# Patient Record
Sex: FEMALE | Race: WHITE | NOT HISPANIC OR LATINO | URBAN - METROPOLITAN AREA
[De-identification: names, ages, dates, MRNs, and addresses within clinical notes are randomized per-mention and may not be internally consistent; named-entity substitution may affect disease eponyms.]

---

## 2024-02-14 ENCOUNTER — NURSING HOME VISIT (OUTPATIENT)
Dept: POST ACUTE CARE | Facility: EXTERNAL LOCATION | Age: 49
End: 2024-02-14
Payer: MEDICAID

## 2024-02-14 DIAGNOSIS — I10 BENIGN ESSENTIAL HYPERTENSION: ICD-10-CM

## 2024-02-14 DIAGNOSIS — G80.9 CEREBRAL PALSY, UNSPECIFIED TYPE (MULTI): ICD-10-CM

## 2024-02-14 DIAGNOSIS — F84.0 AUTISTIC DISORDER (HHS-HCC): Primary | ICD-10-CM

## 2024-02-14 DIAGNOSIS — K21.9 GASTROESOPHAGEAL REFLUX DISEASE, UNSPECIFIED WHETHER ESOPHAGITIS PRESENT: ICD-10-CM

## 2024-02-14 DIAGNOSIS — I10 PRIMARY HYPERTENSION: ICD-10-CM

## 2024-02-14 DIAGNOSIS — G82.20 PARAPLEGIA (MULTI): ICD-10-CM

## 2024-02-14 DIAGNOSIS — I50.9 CHRONIC CONGESTIVE HEART FAILURE, UNSPECIFIED HEART FAILURE TYPE (MULTI): ICD-10-CM

## 2024-02-14 PROCEDURE — 99305 1ST NF CARE MODERATE MDM 35: CPT | Performed by: INTERNAL MEDICINE

## 2024-02-14 NOTE — LETTER
Patient: Kathryn Mojica  : 1975    Encounter Date: 2024    HISTORY & PHYSICAL    Subjective  Chief complaint: Kathryn Mojica is a 48 y.o. female who is a acute skilled care patient being seen and evaluated for multiple medical problems.  Patient presents for weakness.    HPI:  Patient is a 48 year old female with a past medical history of cerebral palsy, paraplegia, type 2 diabetes, and HTN. Patient was transferred to the skilled nursing facility from a different nursing home. Patient will work with therapy towards goals.         Past Medical History:   Diagnosis Date   • Acute embolism and thrombosis of unspecified deep veins of unspecified proximal lower extremity (CMS/HCC)     Deep vein thrombosis (DVT) of proximal lower extremity, unspecified chronicity, unspecified laterality   • Other cerebral palsy (CMS/HCC)     Other cerebral palsy   • Personal history of colonic polyps 2018    History of colonic polyps   • Personal history of other diseases of the digestive system     History of irritable bowel syndrome   • Personal history of other diseases of the musculoskeletal system and connective tissue     History of arthritis   • Personal history of other diseases of the respiratory system     History of asthma       Past Surgical History:   Procedure Laterality Date   • COLONOSCOPY  2018    Complete Colonoscopy   • DILATION AND CURETTAGE OF UTERUS  2018    Dilation And Curettage   • ESOPHAGOGASTRODUODENOSCOPY  2018    Diagnostic Esophagogastroduodenoscopy   • OTHER SURGICAL HISTORY  2018    Esophageal Dilation   • OTHER SURGICAL HISTORY  2018    Hysteroscopy   • OTHER SURGICAL HISTORY  2018    Cutaneous Vesicostomy   • OTHER SURGICAL HISTORY  2018    Repair Of Bladder Reconstruction       No family history on file.    Social History     Socioeconomic History   • Marital status: Single     Spouse name: Not on file   • Number of children: Not on file   •  Years of education: Not on file   • Highest education level: Not on file   Occupational History   • Not on file   Tobacco Use   • Smoking status: Not on file   • Smokeless tobacco: Not on file   Substance and Sexual Activity   • Alcohol use: Not on file   • Drug use: Not on file   • Sexual activity: Not on file   Other Topics Concern   • Not on file   Social History Narrative   • Not on file     Social Determinants of Health     Financial Resource Strain: Not on file   Food Insecurity: Not on file   Transportation Needs: Not on file   Physical Activity: Not on file   Stress: Not on file   Social Connections: Not on file   Intimate Partner Violence: Not on file   Housing Stability: Not on file       Vital signs: 134/71, 97.8, 90, 18, 114.0, 96%    Objective  Physical Exam  Constitutional:       General: She is not in acute distress.  Eyes:      Extraocular Movements: Extraocular movements intact.   Pulmonary:      Effort: Pulmonary effort is normal.   Musculoskeletal:      Cervical back: Neck supple.   Neurological:      Mental Status: She is alert.   Psychiatric:         Mood and Affect: Mood normal.         Behavior: Behavior is cooperative.         Assessment/Plan  Problem List Items Addressed This Visit       HTN (hypertension)     Continue current medications         Autistic disorder - Primary     Supportive care         Benign essential hypertension     Continue current medications         Cerebral palsy (CMS/[facility])     Supportive care  PT OT         CHF (congestive heart failure) (CMS/[facility])     Continue current medications         Gastroesophageal reflux disease     Continue current medications         Paraplegia (CMS/[facility])     Continue current medications          Medications, treatments, and labs reviewed  Continue medications and treatments as listed in [facility]    Scribe Attestation  I, Tristan Yoo   attest that this documentation has been prepared under the direction and in the presence of Hector SIMONS  DO Jamil.    Provider Attestation - Scribe documentation  All medical record entries made by the Scribe were at my direction and personally dictated by me. I have reviewed the chart and agree that the record accurately reflects my personal performance of the history, physical exam, discussion and plan.    Hector Negron DO          Electronically Signed By: Hector Negron DO   3/14/24  9:37 PM

## 2024-02-16 ENCOUNTER — NURSING HOME VISIT (OUTPATIENT)
Dept: POST ACUTE CARE | Facility: EXTERNAL LOCATION | Age: 49
End: 2024-02-16
Payer: MEDICAID

## 2024-02-16 DIAGNOSIS — I10 PRIMARY HYPERTENSION: Primary | ICD-10-CM

## 2024-02-16 DIAGNOSIS — U07.1 COVID: ICD-10-CM

## 2024-02-16 PROCEDURE — 99308 SBSQ NF CARE LOW MDM 20: CPT | Performed by: REGISTERED NURSE

## 2024-02-16 NOTE — LETTER
Patient: Kahtryn Mojica  : 1975    Encounter Date: 2024    PROGRESS NOTE    Subjective  Chief complaint: Kathryn Mojica is a 48 y.o. female who is a long term care patient being seen and evaluated for follow up.     HPI:  HPI seen for bp med adjustment pt states hydralazine making her dizzy pt also + covid   Objective  Vital signs: 122/82, 97.8, 90, 18, 114.0, 96%    Physical Exam  Constitutional:       General: She is not in acute distress.  Eyes:      Extraocular Movements: Extraocular movements intact.   Pulmonary:      Effort: Pulmonary effort is normal.   Musculoskeletal:      Cervical back: Neck supple.   Neurological:      Mental Status: She is alert.   Psychiatric:         Mood and Affect: Mood normal.         Behavior: Behavior is cooperative.         Assessment/Plan  Problem List Items Addressed This Visit       HTN (hypertension) - Primary     Dc hydralazine  Start lisinopril monitor          COVID     Isolation  Symptom management           Medications, treatments, and labs reviewed  Continue medications and treatments as listed in [facility]    Scribe Attestation  IAundrea Scribe   attest that this documentation has been prepared under the direction and in the presence of LARISA Robison.    Provider Attestation - Scribe documentation  All medical record entries made by the Scribe were at my direction and personally dictated by me. I have reviewed the chart and agree that the record accurately reflects my personal performance of the history, physical exam, discussion and plan.    LARISA Robison          Electronically Signed By: LARISA Robison   24  6:05 PM

## 2024-02-19 ENCOUNTER — NURSING HOME VISIT (OUTPATIENT)
Dept: POST ACUTE CARE | Facility: EXTERNAL LOCATION | Age: 49
End: 2024-02-19
Payer: MEDICAID

## 2024-02-19 DIAGNOSIS — U07.1 COVID: Primary | ICD-10-CM

## 2024-02-19 PROCEDURE — 99308 SBSQ NF CARE LOW MDM 20: CPT | Performed by: REGISTERED NURSE

## 2024-02-19 NOTE — LETTER
Patient: Kathryn Mojica  : 1975    Encounter Date: 2024    PROGRESS NOTE    Subjective  Chief complaint: Kathryn Mojica is a 48 y.o. female who is a long term care patient being seen and evaluated for COVID-19.    HPI:  HPI pt c/o clear nasal drainage denies fever chills no sob   Objective  Vital signs: 122/82, 97.8, 90, 18, 114.0, 96%    Physical Exam  Constitutional:       General: She is not in acute distress.  Eyes:      Extraocular Movements: Extraocular movements intact.   Pulmonary:      Effort: Pulmonary effort is normal.   Musculoskeletal:      Cervical back: Neck supple.   Neurological:      Mental Status: She is alert.   Psychiatric:         Mood and Affect: Mood normal.         Behavior: Behavior is cooperative.         Assessment/Plan  Problem List Items Addressed This Visit       COVID - Primary     Isolation  Symptom management           Medications, treatments, and labs reviewed  Continue medications and treatments as listed in [facility]    Scribe Attestation  IAundrea Scribe   attest that this documentation has been prepared under the direction and in the presence of LARISA Robison.    Provider Attestation - Scribe documentation  All medical record entries made by the Scribe were at my direction and personally dictated by me. I have reviewed the chart and agree that the record accurately reflects my personal performance of the history, physical exam, discussion and plan.    LARISA Robison          Electronically Signed By: LARISA Robison   3/11/24  8:00 PM

## 2024-02-20 ENCOUNTER — NURSING HOME VISIT (OUTPATIENT)
Dept: POST ACUTE CARE | Facility: EXTERNAL LOCATION | Age: 49
End: 2024-02-20
Payer: MEDICAID

## 2024-02-20 DIAGNOSIS — U07.1 COVID: Primary | ICD-10-CM

## 2024-02-20 PROCEDURE — 99308 SBSQ NF CARE LOW MDM 20: CPT | Performed by: REGISTERED NURSE

## 2024-02-20 NOTE — LETTER
Patient: Kathryn Mojica  : 1975    Encounter Date: 2024    PROGRESS NOTE    Subjective  Chief complaint: Kathryn Mojica is a 48 y.o. female who is a long term care patient being seen and evaluated for COVID-19.     HPI:  HPI patient isolated with COVID-19 denies shortness of breath fever chills  Objective  Vital signs: 136/78, 97.8, 90, 18, 114.0, 96%    Physical Exam  Constitutional:       General: She is not in acute distress.  Eyes:      Extraocular Movements: Extraocular movements intact.   Pulmonary:      Effort: Pulmonary effort is normal.   Musculoskeletal:      Cervical back: Neck supple.   Neurological:      Mental Status: She is alert.   Psychiatric:         Mood and Affect: Mood normal.         Behavior: Behavior is cooperative.         Assessment/Plan  Problem List Items Addressed This Visit       COVID - Primary     Isolate   Supportive care           Medications, treatments, and labs reviewed  Continue medications and treatments as listed in [facility]    Scribe Attestation  IAundrea Scribe   attest that this documentation has been prepared under the direction and in the presence of LARISA Robison.    Provider Attestation - Scribe documentation  All medical record entries made by the Scribe were at my direction and personally dictated by me. I have reviewed the chart and agree that the record accurately reflects my personal performance of the history, physical exam, discussion and plan.    LARISA Robison          Electronically Signed By: LARISA Robison   3/14/24  9:45 AM

## 2024-02-20 NOTE — PROGRESS NOTES
PROGRESS NOTE    Subjective   Chief complaint: Kathryn Mojica is a 48 y.o. female who is a long term care patient being seen and evaluated for COVID-19.    HPI:  HPI pt c/o clear nasal drainage denies fever chills no sob   Objective   Vital signs: 122/82, 97.8, 90, 18, 114.0, 96%    Physical Exam  Constitutional:       General: She is not in acute distress.  Eyes:      Extraocular Movements: Extraocular movements intact.   Pulmonary:      Effort: Pulmonary effort is normal.   Musculoskeletal:      Cervical back: Neck supple.   Neurological:      Mental Status: She is alert.   Psychiatric:         Mood and Affect: Mood normal.         Behavior: Behavior is cooperative.         Assessment/Plan   Problem List Items Addressed This Visit       COVID - Primary     Isolation  Symptom management           Medications, treatments, and labs reviewed  Continue medications and treatments as listed in [facility]    Scribe Attestation  IAundrea Scribe   attest that this documentation has been prepared under the direction and in the presence of LARISA Robison.    Provider Attestation - Scribe documentation  All medical record entries made by the Scribe were at my direction and personally dictated by me. I have reviewed the chart and agree that the record accurately reflects my personal performance of the history, physical exam, discussion and plan.    LARISA Robison

## 2024-02-20 NOTE — PROGRESS NOTES
PROGRESS NOTE    Subjective   Chief complaint: Kathryn Mojica is a 48 y.o. female who is a long term care patient being seen and evaluated for follow up.     HPI:  HPI seen for bp med adjustment pt states hydralazine making her dizzy pt also + covid   Objective   Vital signs: 122/82, 97.8, 90, 18, 114.0, 96%    Physical Exam  Constitutional:       General: She is not in acute distress.  Eyes:      Extraocular Movements: Extraocular movements intact.   Pulmonary:      Effort: Pulmonary effort is normal.   Musculoskeletal:      Cervical back: Neck supple.   Neurological:      Mental Status: She is alert.   Psychiatric:         Mood and Affect: Mood normal.         Behavior: Behavior is cooperative.         Assessment/Plan   Problem List Items Addressed This Visit       HTN (hypertension) - Primary     Dc hydralazine  Start lisinopril monitor          COVID     Isolation  Symptom management           Medications, treatments, and labs reviewed  Continue medications and treatments as listed in [facility]    Scribe Attestation  Aundrea WOO Scribe   attest that this documentation has been prepared under the direction and in the presence of LARISA Robison.    Provider Attestation - Scribe documentation  All medical record entries made by the Scribe were at my direction and personally dictated by me. I have reviewed the chart and agree that the record accurately reflects my personal performance of the history, physical exam, discussion and plan.    LARISA Robison

## 2024-02-21 NOTE — PROGRESS NOTES
PROGRESS NOTE    Subjective   Chief complaint: Kathryn Mojica is a 48 y.o. female who is a long term care patient being seen and evaluated for COVID-19.     HPI:  HPI patient isolated with COVID-19 denies shortness of breath fever chills  Objective   Vital signs: 136/78, 97.8, 90, 18, 114.0, 96%    Physical Exam  Constitutional:       General: She is not in acute distress.  Eyes:      Extraocular Movements: Extraocular movements intact.   Pulmonary:      Effort: Pulmonary effort is normal.   Musculoskeletal:      Cervical back: Neck supple.   Neurological:      Mental Status: She is alert.   Psychiatric:         Mood and Affect: Mood normal.         Behavior: Behavior is cooperative.         Assessment/Plan   Problem List Items Addressed This Visit       COVID - Primary     Isolate   Supportive care           Medications, treatments, and labs reviewed  Continue medications and treatments as listed in [facility]    Scribe Attestation  IAundrea Scribe   attest that this documentation has been prepared under the direction and in the presence of LARISA Robison.    Provider Attestation - Scribe documentation  All medical record entries made by the Scribe were at my direction and personally dictated by me. I have reviewed the chart and agree that the record accurately reflects my personal performance of the history, physical exam, discussion and plan.    LARISA Robison

## 2024-02-23 ENCOUNTER — NURSING HOME VISIT (OUTPATIENT)
Dept: POST ACUTE CARE | Facility: EXTERNAL LOCATION | Age: 49
End: 2024-02-23
Payer: MEDICAID

## 2024-02-23 DIAGNOSIS — U07.1 COVID: Primary | ICD-10-CM

## 2024-02-23 PROCEDURE — 99308 SBSQ NF CARE LOW MDM 20: CPT | Performed by: REGISTERED NURSE

## 2024-02-23 NOTE — LETTER
Patient: Kathryn Mojica  : 1975    Encounter Date: 2024    PROGRESS NOTE    Subjective  Chief complaint: Kathryn Mojica is a 48 y.o. female who is a long term care patient being seen and evaluated for COVID-19.    HPI:  HPI patient continues to be in isolation for COVID-19 denies shortness of breath fever chills.  Objective  Vital signs: 134/90, 97.8, 90, 18, 114.0, 96%    Physical Exam  Constitutional:       General: She is not in acute distress.  Eyes:      Extraocular Movements: Extraocular movements intact.   Pulmonary:      Effort: Pulmonary effort is normal.   Musculoskeletal:      Cervical back: Neck supple.   Neurological:      Mental Status: She is alert.   Psychiatric:         Mood and Affect: Mood normal.         Behavior: Behavior is cooperative.         Assessment/Plan  Problem List Items Addressed This Visit       COVID - Primary     Isolate   Supportive care           Medications, treatments, and labs reviewed  Continue medications and treatments as listed in [facility]    Scribe Attestation  IAundrea Scribe   attest that this documentation has been prepared under the direction and in the presence of LARISA Robison.    Provider Attestation - Scribe documentation  All medical record entries made by the Scribe were at my direction and personally dictated by me. I have reviewed the chart and agree that the record accurately reflects my personal performance of the history, physical exam, discussion and plan.    LARISA Robison          Electronically Signed By: LARISA Robison   3/14/24 11:09 AM

## 2024-02-26 ENCOUNTER — NURSING HOME VISIT (OUTPATIENT)
Dept: POST ACUTE CARE | Facility: EXTERNAL LOCATION | Age: 49
End: 2024-02-26
Payer: MEDICAID

## 2024-02-26 DIAGNOSIS — G82.20 PARAPLEGIA (MULTI): ICD-10-CM

## 2024-02-26 DIAGNOSIS — I50.9 CHRONIC CONGESTIVE HEART FAILURE, UNSPECIFIED HEART FAILURE TYPE (MULTI): ICD-10-CM

## 2024-02-26 DIAGNOSIS — U07.1 COVID: ICD-10-CM

## 2024-02-26 DIAGNOSIS — I10 BENIGN ESSENTIAL HYPERTENSION: Primary | ICD-10-CM

## 2024-02-26 PROCEDURE — 99308 SBSQ NF CARE LOW MDM 20: CPT | Performed by: INTERNAL MEDICINE

## 2024-02-26 NOTE — PROGRESS NOTES
HISTORY & PHYSICAL    Subjective   Chief complaint: Kathryn Mojica is a 48 y.o. female who is a acute skilled care patient being seen and evaluated for multiple medical problems.  Patient presents for weakness.    HPI:  Patient is a 48 year old female with a past medical history of cerebral palsy, paraplegia, type 2 diabetes, and HTN. Patient was transferred to the skilled nursing facility from a different nursing home. Patient will work with therapy towards goals.         Past Medical History:   Diagnosis Date    Acute embolism and thrombosis of unspecified deep veins of unspecified proximal lower extremity (CMS/HCC)     Deep vein thrombosis (DVT) of proximal lower extremity, unspecified chronicity, unspecified laterality    Other cerebral palsy (CMS/HCC)     Other cerebral palsy    Personal history of colonic polyps 04/16/2018    History of colonic polyps    Personal history of other diseases of the digestive system     History of irritable bowel syndrome    Personal history of other diseases of the musculoskeletal system and connective tissue     History of arthritis    Personal history of other diseases of the respiratory system     History of asthma       Past Surgical History:   Procedure Laterality Date    COLONOSCOPY  04/16/2018    Complete Colonoscopy    DILATION AND CURETTAGE OF UTERUS  04/16/2018    Dilation And Curettage    ESOPHAGOGASTRODUODENOSCOPY  04/16/2018    Diagnostic Esophagogastroduodenoscopy    OTHER SURGICAL HISTORY  02/21/2018    Esophageal Dilation    OTHER SURGICAL HISTORY  02/21/2018    Hysteroscopy    OTHER SURGICAL HISTORY  02/21/2018    Cutaneous Vesicostomy    OTHER SURGICAL HISTORY  08/01/2018    Repair Of Bladder Reconstruction       No family history on file.    Social History     Socioeconomic History    Marital status: Single     Spouse name: Not on file    Number of children: Not on file    Years of education: Not on file    Highest education level: Not on file   Occupational  History    Not on file   Tobacco Use    Smoking status: Not on file    Smokeless tobacco: Not on file   Substance and Sexual Activity    Alcohol use: Not on file    Drug use: Not on file    Sexual activity: Not on file   Other Topics Concern    Not on file   Social History Narrative    Not on file     Social Determinants of Health     Financial Resource Strain: Not on file   Food Insecurity: Not on file   Transportation Needs: Not on file   Physical Activity: Not on file   Stress: Not on file   Social Connections: Not on file   Intimate Partner Violence: Not on file   Housing Stability: Not on file       Vital signs: 134/71, 97.8, 90, 18, 114.0, 96%    Objective   Physical Exam  Constitutional:       General: She is not in acute distress.  Eyes:      Extraocular Movements: Extraocular movements intact.   Pulmonary:      Effort: Pulmonary effort is normal.   Musculoskeletal:      Cervical back: Neck supple.   Neurological:      Mental Status: She is alert.   Psychiatric:         Mood and Affect: Mood normal.         Behavior: Behavior is cooperative.         Assessment/Plan   Problem List Items Addressed This Visit       HTN (hypertension)     Continue current medications         Autistic disorder - Primary     Supportive care         Benign essential hypertension     Continue current medications         Cerebral palsy (CMS/[facility])     Supportive care  PT OT         CHF (congestive heart failure) (CMS/[facility])     Continue current medications         Gastroesophageal reflux disease     Continue current medications         Paraplegia (CMS/[facility])     Continue current medications          Medications, treatments, and labs reviewed  Continue medications and treatments as listed in [facility]    Scribe Attestation  I, Tristan Yoo   attest that this documentation has been prepared under the direction and in the presence of Hector Negron DO.    Provider Attestation - Scribe documentation  All medical record entries made  by the Scribe were at my direction and personally dictated by me. I have reviewed the chart and agree that the record accurately reflects my personal performance of the history, physical exam, discussion and plan.    Hector Negron, DO

## 2024-02-26 NOTE — PROGRESS NOTES
PROGRESS NOTE    Subjective   Chief complaint: Kathryn Mojica is a 48 y.o. female who is a long term care patient being seen and evaluated for COVID-19.    HPI:  HPI patient continues to be in isolation for COVID-19 denies shortness of breath fever chills.  Objective   Vital signs: 134/90, 97.8, 90, 18, 114.0, 96%    Physical Exam  Constitutional:       General: She is not in acute distress.  Eyes:      Extraocular Movements: Extraocular movements intact.   Pulmonary:      Effort: Pulmonary effort is normal.   Musculoskeletal:      Cervical back: Neck supple.   Neurological:      Mental Status: She is alert.   Psychiatric:         Mood and Affect: Mood normal.         Behavior: Behavior is cooperative.         Assessment/Plan   Problem List Items Addressed This Visit       COVID - Primary     Isolate   Supportive care           Medications, treatments, and labs reviewed  Continue medications and treatments as listed in [facility]    Scribe Attestation  Aundrea WOO Scribe   attest that this documentation has been prepared under the direction and in the presence of LARISA Robison.    Provider Attestation - Scribe documentation  All medical record entries made by the Scribe were at my direction and personally dictated by me. I have reviewed the chart and agree that the record accurately reflects my personal performance of the history, physical exam, discussion and plan.    LARISA Robison

## 2024-02-26 NOTE — LETTER
Patient: Kathryn Mojica  : 1975    Encounter Date: 2024    PROGRESS NOTE    Subjective  Chief complaint: Kathryn Mojica is a 48 y.o. female who is a long term care patient being seen and evaluated for covid    HPI:  Asked by nursing to see patient due to being COVID-positive.  Patient seen and examined at bedside.  No issues.  Breathing is at baseline.  No fevers or chills.      Objective  Vital signs: 129/82, 98.1, 86, 18, 106.0, 97%    Physical Exam  Constitutional:       General: She is not in acute distress.  Eyes:      Extraocular Movements: Extraocular movements intact.   Pulmonary:      Effort: Pulmonary effort is normal.   Musculoskeletal:      Cervical back: Neck supple.   Neurological:      Mental Status: She is alert.   Psychiatric:         Mood and Affect: Mood normal.         Behavior: Behavior is cooperative.       Assessment/Plan  Problem List Items Addressed This Visit       COVID     Continue current medications  Supportive care  Monitor oxygen levels         Benign essential hypertension - Primary     Continue current medications         CHF (congestive heart failure) (CMS/[facility])     Continue current medications         Paraplegia (CMS/[facility])     Supportive care          Medications, treatments, and labs reviewed  Continue medications and treatments as listed in [facility]    Scribe Attestation  IAundrea Scribe   attest that this documentation has been prepared under the direction and in the presence of Hector Negron DO.    Provider Attestation - Scribe documentation  All medical record entries made by the Scribe were at my direction and personally dictated by me. I have reviewed the chart and agree that the record accurately reflects my personal performance of the history, physical exam, discussion and plan.    Hector Negron DO          Electronically Signed By: Hector Negron DO   3/30/24  7:04 PM

## 2024-02-28 ENCOUNTER — NURSING HOME VISIT (OUTPATIENT)
Dept: POST ACUTE CARE | Facility: EXTERNAL LOCATION | Age: 49
End: 2024-02-28
Payer: MEDICAID

## 2024-02-28 DIAGNOSIS — I49.8 OTHER SPECIFIED CARDIAC ARRHYTHMIAS: ICD-10-CM

## 2024-02-28 DIAGNOSIS — K21.9 GASTROESOPHAGEAL REFLUX DISEASE, UNSPECIFIED WHETHER ESOPHAGITIS PRESENT: ICD-10-CM

## 2024-02-28 DIAGNOSIS — I10 BENIGN ESSENTIAL HYPERTENSION: Primary | ICD-10-CM

## 2024-02-28 PROBLEM — U07.1 COVID: Status: ACTIVE | Noted: 2024-02-28

## 2024-02-28 PROCEDURE — 99309 SBSQ NF CARE MODERATE MDM 30: CPT | Performed by: REGISTERED NURSE

## 2024-02-28 NOTE — LETTER
Patient: Kathryn Mojica  : 1975    Encounter Date: 2024    PROGRESS NOTE    Subjective  Chief complaint: Kathryn Mojica is a 48 y.o. female patient being seen and evaluated for monthly general medical care and follow-up    HPI:  24 Patient presents for general medical care and f/u.  Patient seen and examined at bedside.  No issues per nursing.  Patient has no acute complaints.          Objective  Vital signs: 142/73, 97.8, 90, 18, 114.0, 96%    Physical Exam  Constitutional:       General: She is not in acute distress.  Eyes:      Extraocular Movements: Extraocular movements intact.   Pulmonary:      Effort: Pulmonary effort is normal.   Musculoskeletal:      Cervical back: Neck supple.   Neurological:      Mental Status: She is alert.   Psychiatric:         Mood and Affect: Mood normal.         Behavior: Behavior is cooperative.         Assessment/Plan  Problem List Items Addressed This Visit       Benign essential hypertension - Primary     Continue lisinopril         Gastroesophageal reflux disease     Diet-controlled          Other specified cardiac arrhythmias     Xarelto  Monitor for bruising and bleeding          Medications, treatments, and labs reviewed  Continue medications and treatments as listed in EMR    Scribe Attestation  IAundrea Scribe   attest that this documentation has been prepared under the direction and in the presence of LARISA Robison.    Provider Attestation - Scribe documentation  All medical record entries made by the Scribe were at my direction and personally dictated by me. I have reviewed the chart and agree that the record accurately reflects my personal performance of the history, physical exam, discussion and plan.    LARISA Robison      Electronically Signed By: LARISA Robison   3/21/24  9:40 AM

## 2024-02-29 NOTE — PROGRESS NOTES
PROGRESS NOTE    Subjective   Chief complaint: Kathryn Mojica is a 48 y.o. female patient being seen and evaluated for monthly general medical care and follow-up    HPI:  2/28/24 Patient presents for general medical care and f/u.  Patient seen and examined at bedside.  No issues per nursing.  Patient has no acute complaints.          Objective   Vital signs: 142/73, 97.8, 90, 18, 114.0, 96%    Physical Exam  Constitutional:       General: She is not in acute distress.  Eyes:      Extraocular Movements: Extraocular movements intact.   Pulmonary:      Effort: Pulmonary effort is normal.   Musculoskeletal:      Cervical back: Neck supple.   Neurological:      Mental Status: She is alert.   Psychiatric:         Mood and Affect: Mood normal.         Behavior: Behavior is cooperative.         Assessment/Plan   Problem List Items Addressed This Visit       Benign essential hypertension - Primary     Continue lisinopril         Gastroesophageal reflux disease     Diet-controlled          Other specified cardiac arrhythmias     Xarelto  Monitor for bruising and bleeding          Medications, treatments, and labs reviewed  Continue medications and treatments as listed in EMR    Scribe Attestation  IAundrea Scribe   attest that this documentation has been prepared under the direction and in the presence of LARISA Robison.    Provider Attestation - Scribe documentation  All medical record entries made by the Scribe were at my direction and personally dictated by me. I have reviewed the chart and agree that the record accurately reflects my personal performance of the history, physical exam, discussion and plan.    LARISA Robison

## 2024-03-04 ENCOUNTER — NURSING HOME VISIT (OUTPATIENT)
Dept: POST ACUTE CARE | Facility: EXTERNAL LOCATION | Age: 49
End: 2024-03-04
Payer: MEDICAID

## 2024-03-04 DIAGNOSIS — G80.9 CEREBRAL PALSY, UNSPECIFIED TYPE (MULTI): ICD-10-CM

## 2024-03-04 DIAGNOSIS — U07.1 COVID: ICD-10-CM

## 2024-03-04 DIAGNOSIS — G82.20 PARAPLEGIA (MULTI): ICD-10-CM

## 2024-03-04 DIAGNOSIS — I10 BENIGN ESSENTIAL HYPERTENSION: Primary | ICD-10-CM

## 2024-03-04 DIAGNOSIS — I50.9 CHRONIC CONGESTIVE HEART FAILURE, UNSPECIFIED HEART FAILURE TYPE (MULTI): ICD-10-CM

## 2024-03-04 PROCEDURE — 99308 SBSQ NF CARE LOW MDM 20: CPT | Performed by: INTERNAL MEDICINE

## 2024-03-04 NOTE — LETTER
Patient: Kathryn Mojica  : 1975    Encounter Date: 2024    PROGRESS NOTE    Subjective  Chief complaint: Kathryn Mojica is a 48 y.o. female who is a long term care patient being seen and evaluated for recovered from COVID-19.     HPI:  Patient just finished quarantine for COVID. She is doing well. No issues.       Objective  Vital signs: 123/69, 97.8, 90, 18, 114.0, 96%    Physical Exam  Constitutional:       General: She is not in acute distress.  Eyes:      Extraocular Movements: Extraocular movements intact.   Pulmonary:      Effort: Pulmonary effort is normal.   Musculoskeletal:      Cervical back: Neck supple.   Neurological:      Mental Status: She is alert.   Psychiatric:         Mood and Affect: Mood normal.         Behavior: Behavior is cooperative.         Assessment/Plan  Problem List Items Addressed This Visit       COVID     Resolved          Benign essential hypertension - Primary     Continue current medications          Cerebral palsy (CMS/[facility])     Supportive care         CHF (congestive heart failure) (CMS/[facility])     Continue current medications          Paraplegia (CMS/[facility])     Supportive care          Medications, treatments, and labs reviewed  Continue medications and treatments as listed in [facility]    Scribe Attestation  I, Tristan Yoo   attest that this documentation has been prepared under the direction and in the presence of Hector Negron DO.    Provider Attestation - Scribe documentation  All medical record entries made by the Scribe were at my direction and personally dictated by me. I have reviewed the chart and agree that the record accurately reflects my personal performance of the history, physical exam, discussion and plan.    Hector Negron DO          Electronically Signed By: Hector Negron DO   24  9:46 PM

## 2024-03-05 NOTE — PROGRESS NOTES
PROGRESS NOTE    Subjective   Chief complaint: Kathryn Mojica is a 48 y.o. female who is a long term care patient being seen and evaluated for recovered from COVID-19.     HPI:  Patient just finished quarantine for COVID. She is doing well. No issues.       Objective   Vital signs: 123/69, 97.8, 90, 18, 114.0, 96%    Physical Exam  Constitutional:       General: She is not in acute distress.  Eyes:      Extraocular Movements: Extraocular movements intact.   Pulmonary:      Effort: Pulmonary effort is normal.   Musculoskeletal:      Cervical back: Neck supple.   Neurological:      Mental Status: She is alert.   Psychiatric:         Mood and Affect: Mood normal.         Behavior: Behavior is cooperative.         Assessment/Plan   Problem List Items Addressed This Visit       COVID     Resolved          Benign essential hypertension - Primary     Continue current medications          Cerebral palsy (CMS/[facility])     Supportive care         CHF (congestive heart failure) (CMS/[facility])     Continue current medications          Paraplegia (CMS/[facility])     Supportive care          Medications, treatments, and labs reviewed  Continue medications and treatments as listed in [facility]    Scribe Attestation  IAundrea Scribe   attest that this documentation has been prepared under the direction and in the presence of Hector Negron DO.    Provider Attestation - Scribe documentation  All medical record entries made by the Scribe were at my direction and personally dictated by me. I have reviewed the chart and agree that the record accurately reflects my personal performance of the history, physical exam, discussion and plan.    Hector Negron DO

## 2024-03-14 PROBLEM — G80.9 CEREBRAL PALSY (MULTI): Status: ACTIVE | Noted: 2024-03-14

## 2024-03-14 PROBLEM — I10 BENIGN ESSENTIAL HYPERTENSION: Status: ACTIVE | Noted: 2024-03-14

## 2024-03-14 PROBLEM — K21.9 GASTROESOPHAGEAL REFLUX DISEASE: Status: ACTIVE | Noted: 2024-03-14

## 2024-03-14 PROBLEM — I50.9 CHF (CONGESTIVE HEART FAILURE) (MULTI): Status: ACTIVE | Noted: 2024-03-14

## 2024-03-14 PROBLEM — F84.0 AUTISTIC DISORDER (HHS-HCC): Status: ACTIVE | Noted: 2024-03-14

## 2024-03-14 PROBLEM — G82.20 PARAPLEGIA (MULTI): Status: ACTIVE | Noted: 2024-03-14

## 2024-03-15 ENCOUNTER — NURSING HOME VISIT (OUTPATIENT)
Dept: POST ACUTE CARE | Facility: EXTERNAL LOCATION | Age: 49
End: 2024-03-15
Payer: MEDICAID

## 2024-03-15 DIAGNOSIS — R10.9 ABDOMINAL CRAMPING: Primary | ICD-10-CM

## 2024-03-15 PROCEDURE — 99308 SBSQ NF CARE LOW MDM 20: CPT | Performed by: REGISTERED NURSE

## 2024-03-15 NOTE — LETTER
Patient: Kathryn Mojica  : 1975    Encounter Date: 03/15/2024    PROGRESS NOTE    Subjective  Chief complaint: Kathryn Mojica is a 48 y.o. female who is a long term care patient being seen and evaluated for follow up.     HPI:  HPI pt c/o abd cramping, states having BM and thinks it could be diet related   Objective  Vital signs: 123/85, 98.1, 85, 18, 114.0, 97%    Physical Exam  Constitutional:       General: She is not in acute distress.  Eyes:      Extraocular Movements: Extraocular movements intact.   Pulmonary:      Effort: Pulmonary effort is normal.   Musculoskeletal:      Cervical back: Neck supple.   Neurological:      Mental Status: She is alert.   Psychiatric:         Mood and Affect: Mood normal.         Behavior: Behavior is cooperative.         Assessment/Plan  Problem List Items Addressed This Visit       Abdominal cramping - Primary     UACNS, CBC, BMP  Monitor BM          Medications, treatments, and labs reviewed  Continue medications and treatments as listed in [facility]    Scribe Attestation  IAundrea Scribe   attest that this documentation has been prepared under the direction and in the presence of LRAISA Robison.    Provider Attestation - Scribe documentation  All medical record entries made by the Scribe were at my direction and personally dictated by me. I have reviewed the chart and agree that the record accurately reflects my personal performance of the history, physical exam, discussion and plan.    LARISA Robison          Electronically Signed By: LARISA Robison   3/28/24 12:07 PM

## 2024-03-18 ENCOUNTER — NURSING HOME VISIT (OUTPATIENT)
Dept: POST ACUTE CARE | Facility: EXTERNAL LOCATION | Age: 49
End: 2024-03-18
Payer: MEDICAID

## 2024-03-18 DIAGNOSIS — N30.00 ACUTE CYSTITIS WITHOUT HEMATURIA: Primary | ICD-10-CM

## 2024-03-18 PROCEDURE — 99309 SBSQ NF CARE MODERATE MDM 30: CPT | Performed by: INTERNAL MEDICINE

## 2024-03-18 NOTE — LETTER
Patient: Kathryn Mojica  : 1975    Encounter Date: 2024    PROGRESS NOTE    Subjective  Chief complaint: Kathryn Mojica is a 48 y.o. female who is a long term care patient being seen and evaluated for uti.    HPI:  Patient seen for follow up UA. Consistent with UTI. Started on antibiotics.      Objective  Vital signs: 129/82, 98.1, 86, 18, 106.0, 97%    Physical Exam  Constitutional:       General: She is not in acute distress.  Eyes:      Extraocular Movements: Extraocular movements intact.   Pulmonary:      Effort: Pulmonary effort is normal.   Musculoskeletal:      Cervical back: Neck supple.   Neurological:      Mental Status: She is alert.   Psychiatric:         Mood and Affect: Mood normal.         Behavior: Behavior is cooperative.       Assessment/Plan  Problem List Items Addressed This Visit       Acute cystitis without hematuria - Primary     Macrobid          Medications, treatments, and labs reviewed  Continue medications and treatments as listed in [facility]    Scribe Attestation  I, Tristan Yoo   attest that this documentation has been prepared under the direction and in the presence of Hector Negron DO.    Provider Attestation - Scribe documentation  All medical record entries made by the Scribe were at my direction and personally dictated by me. I have reviewed the chart and agree that the record accurately reflects my personal performance of the history, physical exam, discussion and plan.    Hector Negron DO          Electronically Signed By: Hector Negron DO   24 10:46 PM

## 2024-03-19 NOTE — PROGRESS NOTES
PROGRESS NOTE    Subjective   Chief complaint: Kathryn Mojica is a 48 y.o. female who is a long term care patient being seen and evaluated for follow up.     HPI:  HPI pt c/o abd cramping, states having BM and thinks it could be diet related   Objective   Vital signs: 123/85, 98.1, 85, 18, 114.0, 97%    Physical Exam  Constitutional:       General: She is not in acute distress.  Eyes:      Extraocular Movements: Extraocular movements intact.   Pulmonary:      Effort: Pulmonary effort is normal.   Musculoskeletal:      Cervical back: Neck supple.   Neurological:      Mental Status: She is alert.   Psychiatric:         Mood and Affect: Mood normal.         Behavior: Behavior is cooperative.         Assessment/Plan   Problem List Items Addressed This Visit       Abdominal cramping - Primary     UACNS, CBC, BMP  Monitor BM          Medications, treatments, and labs reviewed  Continue medications and treatments as listed in [facility]    Scribe Attestation  I, Tristan Yoo   attest that this documentation has been prepared under the direction and in the presence of LARISA Robison.    Provider Attestation - Scribe documentation  All medical record entries made by the Scribe were at my direction and personally dictated by me. I have reviewed the chart and agree that the record accurately reflects my personal performance of the history, physical exam, discussion and plan.    LARISA Robison

## 2024-03-21 PROBLEM — I49.8 OTHER SPECIFIED CARDIAC ARRHYTHMIAS: Status: ACTIVE | Noted: 2024-03-21

## 2024-03-22 ENCOUNTER — NURSING HOME VISIT (OUTPATIENT)
Dept: POST ACUTE CARE | Facility: EXTERNAL LOCATION | Age: 49
End: 2024-03-22
Payer: MEDICAID

## 2024-03-22 DIAGNOSIS — K21.9 GASTROESOPHAGEAL REFLUX DISEASE, UNSPECIFIED WHETHER ESOPHAGITIS PRESENT: Primary | ICD-10-CM

## 2024-03-22 PROCEDURE — 99308 SBSQ NF CARE LOW MDM 20: CPT | Performed by: REGISTERED NURSE

## 2024-03-22 NOTE — LETTER
Patient: Kathryn Mojica  : 1975    Encounter Date: 2024    PROGRESS NOTE    Subjective  Chief complaint: Kathryn Mojica is a 48 y.o. female who is a long term care patient being seen and evaluated for follow up.     HPI:  HPI pt request visit, want to discuss food options, refer to facility   Objective  Vital signs: 142/82, 98.1, 89, 18, 106.0, 97%    Physical Exam  Constitutional:       General: She is not in acute distress.  Eyes:      Extraocular Movements: Extraocular movements intact.   Pulmonary:      Effort: Pulmonary effort is normal.   Musculoskeletal:      Cervical back: Neck supple.   Neurological:      Mental Status: She is alert.   Psychiatric:         Mood and Affect: Mood normal.         Behavior: Behavior is cooperative.         Assessment/Plan  Problem List Items Addressed This Visit       Gastroesophageal reflux disease - Primary     Refer to food choices per facility           Medications, treatments, and labs reviewed  Continue medications and treatments as listed in [facility]    Scribe Attestation  IAundrea Scribe   attest that this documentation has been prepared under the direction and in the presence of LARISA Robison.    Provider Attestation - Scribe documentation  All medical record entries made by the Scribe were at my direction and personally dictated by me. I have reviewed the chart and agree that the record accurately reflects my personal performance of the history, physical exam, discussion and plan.    LARISA Robison          Electronically Signed By: LARISA Robison   24  8:16 PM

## 2024-03-25 NOTE — PROGRESS NOTES
PROGRESS NOTE    Subjective   Chief complaint: Kathryn Mojica is a 48 y.o. female who is a long term care patient being seen and evaluated for follow up.     HPI:  HPI pt request visit, want to discuss food options, refer to facility   Objective   Vital signs: 142/82, 98.1, 89, 18, 106.0, 97%    Physical Exam  Constitutional:       General: She is not in acute distress.  Eyes:      Extraocular Movements: Extraocular movements intact.   Pulmonary:      Effort: Pulmonary effort is normal.   Musculoskeletal:      Cervical back: Neck supple.   Neurological:      Mental Status: She is alert.   Psychiatric:         Mood and Affect: Mood normal.         Behavior: Behavior is cooperative.         Assessment/Plan   Problem List Items Addressed This Visit       Gastroesophageal reflux disease - Primary     Refer to food choices per facility           Medications, treatments, and labs reviewed  Continue medications and treatments as listed in [facility]    Scribe Attestation  IAundrea Scribe   attest that this documentation has been prepared under the direction and in the presence of LARISA Robison.    Provider Attestation - Scribe documentation  All medical record entries made by the Scribe were at my direction and personally dictated by me. I have reviewed the chart and agree that the record accurately reflects my personal performance of the history, physical exam, discussion and plan.    LARISA Robsion

## 2024-03-28 PROBLEM — R10.9 ABDOMINAL CRAMPING: Status: ACTIVE | Noted: 2024-03-28

## 2024-03-29 ENCOUNTER — NURSING HOME VISIT (OUTPATIENT)
Dept: POST ACUTE CARE | Facility: EXTERNAL LOCATION | Age: 49
End: 2024-03-29
Payer: MEDICAID

## 2024-03-29 DIAGNOSIS — R10.9 ABDOMINAL CRAMPING: Primary | ICD-10-CM

## 2024-03-29 DIAGNOSIS — G82.20 PARAPLEGIA (MULTI): ICD-10-CM

## 2024-03-29 DIAGNOSIS — I50.9 CHRONIC CONGESTIVE HEART FAILURE, UNSPECIFIED HEART FAILURE TYPE (MULTI): ICD-10-CM

## 2024-03-29 PROCEDURE — 99309 SBSQ NF CARE MODERATE MDM 30: CPT | Performed by: REGISTERED NURSE

## 2024-03-29 NOTE — PROGRESS NOTES
PROGRESS NOTE    Subjective   Chief complaint: Kathryn Mojica is a 48 y.o. female who is a long term care patient being seen and evaluated for covid    HPI:  Asked by nursing to see patient due to being COVID-positive.  Patient seen and examined at bedside.  No issues.  Breathing is at baseline.  No fevers or chills.      Objective   Vital signs: 129/82, 98.1, 86, 18, 106.0, 97%    Physical Exam  Constitutional:       General: She is not in acute distress.  Eyes:      Extraocular Movements: Extraocular movements intact.   Pulmonary:      Effort: Pulmonary effort is normal.   Musculoskeletal:      Cervical back: Neck supple.   Neurological:      Mental Status: She is alert.   Psychiatric:         Mood and Affect: Mood normal.         Behavior: Behavior is cooperative.       Assessment/Plan   Problem List Items Addressed This Visit       COVID     Continue current medications  Supportive care  Monitor oxygen levels         Benign essential hypertension - Primary     Continue current medications         CHF (congestive heart failure) (CMS/[facility])     Continue current medications         Paraplegia (CMS/[facility])     Supportive care          Medications, treatments, and labs reviewed  Continue medications and treatments as listed in [facility]    Scribe Attestation  I, Tristan Yoo   attest that this documentation has been prepared under the direction and in the presence of Hector Negron DO.    Provider Attestation - Scribe documentation  All medical record entries made by the Scribe were at my direction and personally dictated by me. I have reviewed the chart and agree that the record accurately reflects my personal performance of the history, physical exam, discussion and plan.    Hector Negron DO

## 2024-03-29 NOTE — LETTER
Patient: Kathryn Mojica  : 1975    Encounter Date: 2024    PROGRESS NOTE    Subjective  Chief complaint: Kathryn Mojica is a 48 y.o. female patient being seen and evaluated for monthly general medical care and follow-up    HPI:  3/29/24 Patient presents for general medical care and f/u.  Patient seen and examined at bedside.  No issues per nursing.  Patient has no acute complaints.        Objective  Vital signs: 102/64, 98.1, 86, 18, 103.0, 97%    Physical Exam  Constitutional:       General: She is not in acute distress.  Eyes:      Extraocular Movements: Extraocular movements intact.   Pulmonary:      Effort: Pulmonary effort is normal.   Musculoskeletal:      Cervical back: Neck supple.   Neurological:      Mental Status: She is alert.   Psychiatric:         Mood and Affect: Mood normal.         Behavior: Behavior is cooperative.         Assessment/Plan  Problem List Items Addressed This Visit       CHF (congestive heart failure) (Multi)     Continue current medications          Paraplegia (Multi)     Supportive care          Abdominal cramping - Primary     Monitor BM          Medications, treatments, and labs reviewed  Continue medications and treatments as listed in EMR    Scribe Attestation  IAundrea Scribe   attest that this documentation has been prepared under the direction and in the presence of LARISA Robison.    Provider Attestation - Scribe documentation  All medical record entries made by the Scribe were at my direction and personally dictated by me. I have reviewed the chart and agree that the record accurately reflects my personal performance of the history, physical exam, discussion and plan.    LARISA Robison      Electronically Signed By: LARISA Robison   24  7:55 PM

## 2024-03-29 NOTE — PROGRESS NOTES
PROGRESS NOTE    Subjective   Chief complaint: Kathryn Mojica is a 48 y.o. female who is a long term care patient being seen and evaluated for uti.    HPI:  Patient seen for follow up UA. Consistent with UTI. Started on antibiotics.      Objective   Vital signs: 129/82, 98.1, 86, 18, 106.0, 97%    Physical Exam  Constitutional:       General: She is not in acute distress.  Eyes:      Extraocular Movements: Extraocular movements intact.   Pulmonary:      Effort: Pulmonary effort is normal.   Musculoskeletal:      Cervical back: Neck supple.   Neurological:      Mental Status: She is alert.   Psychiatric:         Mood and Affect: Mood normal.         Behavior: Behavior is cooperative.       Assessment/Plan   Problem List Items Addressed This Visit       Acute cystitis without hematuria - Primary     Macrobid          Medications, treatments, and labs reviewed  Continue medications and treatments as listed in [facility]    Scribe Attestation  IAundrea Scribe   attest that this documentation has been prepared under the direction and in the presence of Hector Negron DO.    Provider Attestation - Scribe documentation  All medical record entries made by the Scribe were at my direction and personally dictated by me. I have reviewed the chart and agree that the record accurately reflects my personal performance of the history, physical exam, discussion and plan.    Hector Negron DO

## 2024-03-30 PROBLEM — I10 HTN (HYPERTENSION): Status: RESOLVED | Noted: 2024-02-28 | Resolved: 2024-03-30

## 2024-03-30 NOTE — ASSESSMENT & PLAN NOTE
Phentermine Pending    Insurance response  Prescription Drug Insurance: [employer]  Notes: Prior authorization submitted - will update provider when decision has been made by insurance.        Supportive care

## 2024-04-01 NOTE — PROGRESS NOTES
PROGRESS NOTE    Subjective   Chief complaint: Kathryn Mojica is a 48 y.o. female patient being seen and evaluated for monthly general medical care and follow-up    HPI:  3/29/24 Patient presents for general medical care and f/u.  Patient seen and examined at bedside.  No issues per nursing.  Patient has no acute complaints.        Objective   Vital signs: 102/64, 98.1, 86, 18, 103.0, 97%    Physical Exam  Constitutional:       General: She is not in acute distress.  Eyes:      Extraocular Movements: Extraocular movements intact.   Pulmonary:      Effort: Pulmonary effort is normal.   Musculoskeletal:      Cervical back: Neck supple.   Neurological:      Mental Status: She is alert.   Psychiatric:         Mood and Affect: Mood normal.         Behavior: Behavior is cooperative.         Assessment/Plan   Problem List Items Addressed This Visit       CHF (congestive heart failure) (Multi)     Continue current medications          Paraplegia (Multi)     Supportive care          Abdominal cramping - Primary     Monitor BM          Medications, treatments, and labs reviewed  Continue medications and treatments as listed in EMR    Scribe Attestation  IAundrea Scribe   attest that this documentation has been prepared under the direction and in the presence of LARISA Robison.    Provider Attestation - Scribe documentation  All medical record entries made by the Scribe were at my direction and personally dictated by me. I have reviewed the chart and agree that the record accurately reflects my personal performance of the history, physical exam, discussion and plan.    LARISA Robison

## 2024-04-08 PROBLEM — N30.00 ACUTE CYSTITIS WITHOUT HEMATURIA: Status: ACTIVE | Noted: 2024-04-08

## 2024-04-22 ENCOUNTER — OFFICE VISIT (OUTPATIENT)
Dept: UROLOGY | Facility: CLINIC | Age: 49
End: 2024-04-22
Payer: MEDICAID

## 2024-04-22 VITALS
WEIGHT: 217 LBS | DIASTOLIC BLOOD PRESSURE: 85 MMHG | BODY MASS INDEX: 38.45 KG/M2 | SYSTOLIC BLOOD PRESSURE: 141 MMHG | HEART RATE: 112 BPM | HEIGHT: 63 IN

## 2024-04-22 DIAGNOSIS — N20.0 KIDNEY STONE: ICD-10-CM

## 2024-04-22 DIAGNOSIS — Z78.9 SELF-CATHETERIZES URINARY BLADDER: ICD-10-CM

## 2024-04-22 DIAGNOSIS — N31.9 NEUROGENIC BLADDER: Primary | ICD-10-CM

## 2024-04-22 DIAGNOSIS — N39.0 URINARY TRACT INFECTION WITHOUT HEMATURIA, SITE UNSPECIFIED: ICD-10-CM

## 2024-04-22 LAB
APPEARANCE UR: ABNORMAL
BACTERIA #/AREA URNS AUTO: ABNORMAL /HPF
BILIRUB UR STRIP.AUTO-MCNC: NEGATIVE MG/DL
COLOR UR: YELLOW
GLUCOSE UR STRIP.AUTO-MCNC: NEGATIVE MG/DL
KETONES UR STRIP.AUTO-MCNC: NEGATIVE MG/DL
LEUKOCYTE ESTERASE UR QL STRIP.AUTO: ABNORMAL
NITRITE UR QL STRIP.AUTO: POSITIVE
PH UR STRIP.AUTO: 6 [PH]
POC APPEARANCE, URINE: ABNORMAL
POC BILIRUBIN, URINE: NEGATIVE
POC BLOOD, URINE: ABNORMAL
POC COLOR, URINE: YELLOW
POC GLUCOSE, URINE: NEGATIVE MG/DL
POC KETONES, URINE: NEGATIVE MG/DL
POC LEUKOCYTES, URINE: ABNORMAL
POC NITRITE,URINE: POSITIVE
POC PH, URINE: 6.5 PH
POC PROTEIN, URINE: NEGATIVE MG/DL
POC SPECIFIC GRAVITY, URINE: 1.02
POC UROBILINOGEN, URINE: 0.2 EU/DL
PROT UR STRIP.AUTO-MCNC: NEGATIVE MG/DL
RBC # UR STRIP.AUTO: NEGATIVE /UL
RBC #/AREA URNS AUTO: ABNORMAL /HPF
RENAL EPI CELLS #/AREA UR COMP ASSIST: ABNORMAL /HPF
SP GR UR STRIP.AUTO: 1.01
SQUAMOUS #/AREA URNS AUTO: ABNORMAL /HPF
UROBILINOGEN UR STRIP.AUTO-MCNC: <2 MG/DL
WBC #/AREA URNS AUTO: ABNORMAL /HPF

## 2024-04-22 PROCEDURE — 3080F DIAST BP >= 90 MM HG: CPT | Performed by: NURSE PRACTITIONER

## 2024-04-22 PROCEDURE — 3079F DIAST BP 80-89 MM HG: CPT | Performed by: NURSE PRACTITIONER

## 2024-04-22 PROCEDURE — 81003 URINALYSIS AUTO W/O SCOPE: CPT | Performed by: NURSE PRACTITIONER

## 2024-04-22 PROCEDURE — 81001 URINALYSIS AUTO W/SCOPE: CPT

## 2024-04-22 PROCEDURE — 99214 OFFICE O/P EST MOD 30 MIN: CPT | Performed by: NURSE PRACTITIONER

## 2024-04-22 PROCEDURE — 3077F SYST BP >= 140 MM HG: CPT | Performed by: NURSE PRACTITIONER

## 2024-04-22 PROCEDURE — 1036F TOBACCO NON-USER: CPT | Performed by: NURSE PRACTITIONER

## 2024-04-22 PROCEDURE — 87186 SC STD MICRODIL/AGAR DIL: CPT

## 2024-04-22 PROCEDURE — 87086 URINE CULTURE/COLONY COUNT: CPT

## 2024-04-22 RX ORDER — LISINOPRIL 10 MG/1
TABLET ORAL
COMMUNITY
Start: 2024-03-01

## 2024-04-22 RX ORDER — GLYCERIN 1 G/1
SUPPOSITORY RECTAL
COMMUNITY

## 2024-04-22 RX ORDER — RIVAROXABAN 20 MG/1
1 TABLET, FILM COATED ORAL DAILY
COMMUNITY

## 2024-04-22 RX ORDER — ISOSORBIDE MONONITRATE 30 MG/1
1 TABLET, EXTENDED RELEASE ORAL DAILY
COMMUNITY
Start: 2018-02-21

## 2024-04-22 RX ORDER — NITROFURANTOIN 25; 75 MG/1; MG/1
CAPSULE ORAL
COMMUNITY
Start: 2024-03-15

## 2024-04-22 RX ORDER — FLUTICASONE PROPIONATE 50 MCG
SPRAY, SUSPENSION (ML) NASAL 2 TIMES DAILY
COMMUNITY
Start: 2022-07-11

## 2024-04-22 RX ORDER — ACETAMINOPHEN 325 MG/1
TABLET ORAL EVERY 6 HOURS
COMMUNITY
Start: 2018-02-21

## 2024-04-22 RX ORDER — ONDANSETRON 4 MG/1
TABLET, FILM COATED ORAL
COMMUNITY
Start: 2024-03-16

## 2024-04-22 RX ORDER — SUMATRIPTAN SUCCINATE 25 MG/1
25 TABLET ORAL AS NEEDED
COMMUNITY
Start: 2019-03-18

## 2024-04-22 NOTE — PROGRESS NOTES
Subjective   Patient ID: Kathryn Mojica is a 48 y.o. female who presents for UTI (PT said she is feeling dizzy).  Patient has a complicated urologic history. History of neurogenic bladder secondary to cerebral palsy. She underwent a bladder neck closure in region of catheterizable stoma with subsequent unspecified bladder augmentation several years ago. Since that time she has completed straight cathing every 2-3 hours during the day with volumes approximately 250 to 300 mL and utilizes an indwelling 20 Turk[place] catheter overnight. She is able to manage this independently currently at an assisted living, hoping to move to an appt.      Negative cysto in April 2022 with Dr. Ahumada for eval of gross hematuria. UTI earlier this year, feels somewhat symptomatic at this time with burning and irritation.           Review of Systems   All other systems reviewed and are negative.      Objective   Physical Exam  Vitals reviewed.     Alert and oriented x3  Moist mucous membranes  Breathes easily on room air  Abdomen soft, nondistended. Obese  Utilizes motorized wheelchair   No scleral icterus  Run on sentences, difficult to stay on topic   Appears stated age, no acute distress      Assessment/Plan   Diagnoses and all orders for this visit:  Neurogenic bladder  -     US renal complete; Future  -     Urinalysis with Reflex Culture and Microscopic; Future  Urinary tract infection without hematuria, site unspecified  -     POCT UA Automated manually resulted  -     US renal complete; Future  -     Urinalysis with Reflex Culture and Microscopic; Future  Self-catheterizes urinary bladder  -     US renal complete; Future  -     Urinalysis with Reflex Culture and Microscopic; Future  Kidney stone  -     US renal complete; Future  -     Urinalysis with Reflex Culture and Microscopic; Future    6 month follow up after ultrasound. Continue with current plan of care. Would like me to talk to [facility]. Will give them my contact  info    **PLEASE ALLOW PATIENT TO UTILIZE MCKESSON 16 [place] SILICONE CATH FOR OVERNIGHT USE WITH STERIGEAR URINARY DRAIN BAG WITH FIG LEAF COVER. DURING THE DAY PATIENT WILL UTILIZE RED RUBBER URETHRAL CATH WITH AMSINO WITH 18 [place]**       Patience Joyce, INDIGO-CNP 04/22/24 3:18 PM

## 2024-04-23 ENCOUNTER — NURSING HOME VISIT (OUTPATIENT)
Dept: POST ACUTE CARE | Facility: EXTERNAL LOCATION | Age: 49
End: 2024-04-23
Payer: MEDICAID

## 2024-04-23 DIAGNOSIS — I50.9 CHRONIC CONGESTIVE HEART FAILURE, UNSPECIFIED HEART FAILURE TYPE (MULTI): Primary | ICD-10-CM

## 2024-04-23 DIAGNOSIS — G80.9 CEREBRAL PALSY, UNSPECIFIED TYPE (MULTI): ICD-10-CM

## 2024-04-23 DIAGNOSIS — I10 BENIGN ESSENTIAL HYPERTENSION: ICD-10-CM

## 2024-04-23 LAB — HOLD SPECIMEN: NORMAL

## 2024-04-23 PROCEDURE — 99309 SBSQ NF CARE MODERATE MDM 30: CPT | Performed by: REGISTERED NURSE

## 2024-04-23 NOTE — LETTER
Patient: Kathryn Mojica  : 1975    Encounter Date: 2024    PROGRESS NOTE    Subjective  Chief complaint: Kathryn Mojica is a 48 y.o. female patient being seen and evaluated for monthly general medical care and follow-up    HPI:  24 Patient presents for general medical care and f/u.  Patient seen and examined at bedside.  No issues per nursing.  Patient has no acute complaints.        Objective  Vital signs: 128/81, 98.1, 81, 18, 89.0, 97%    Physical Exam  Constitutional:       General: She is not in acute distress.  Eyes:      Extraocular Movements: Extraocular movements intact.   Pulmonary:      Effort: Pulmonary effort is normal.   Musculoskeletal:      Cervical back: Neck supple.   Neurological:      Mental Status: She is alert.   Psychiatric:         Mood and Affect: Mood normal.         Behavior: Behavior is cooperative.         Assessment/Plan  Problem List Items Addressed This Visit       Benign essential hypertension     Continue lisinopril         Cerebral palsy (Multi)     Supportive care          CHF (congestive heart failure) (Multi) - Primary     Continue current medications           Medications, treatments, and labs reviewed  Continue medications and treatments as listed in EMR    Scribe Attestation  IAundrea Scribe   attest that this documentation has been prepared under the direction and in the presence of LARISA Robison.    Provider Attestation - Scribe documentation  All medical record entries made by the Scribe were at my direction and personally dictated by me. I have reviewed the chart and agree that the record accurately reflects my personal performance of the history, physical exam, discussion and plan.    LARISA Robison      Electronically Signed By: LARISA Robison   24  8:58 PM

## 2024-04-24 NOTE — PROGRESS NOTES
PROGRESS NOTE    Subjective   Chief complaint: Kathryn Mojica is a 48 y.o. female patient being seen and evaluated for monthly general medical care and follow-up    HPI:  4/23/24 Patient presents for general medical care and f/u.  Patient seen and examined at bedside.  No issues per nursing.  Patient has no acute complaints.        Objective   Vital signs: 128/81, 98.1, 81, 18, 89.0, 97%    Physical Exam  Constitutional:       General: She is not in acute distress.  Eyes:      Extraocular Movements: Extraocular movements intact.   Pulmonary:      Effort: Pulmonary effort is normal.   Musculoskeletal:      Cervical back: Neck supple.   Neurological:      Mental Status: She is alert.   Psychiatric:         Mood and Affect: Mood normal.         Behavior: Behavior is cooperative.         Assessment/Plan   Problem List Items Addressed This Visit       Benign essential hypertension     Continue lisinopril         Cerebral palsy (Multi)     Supportive care          CHF (congestive heart failure) (Multi) - Primary     Continue current medications           Medications, treatments, and labs reviewed  Continue medications and treatments as listed in EMR    Scribe Attestation  IAundrea Scribe   attest that this documentation has been prepared under the direction and in the presence of LARISA Robison.    Provider Attestation - Scribe documentation  All medical record entries made by the Scribe were at my direction and personally dictated by me. I have reviewed the chart and agree that the record accurately reflects my personal performance of the history, physical exam, discussion and plan.    LARISA Robison

## 2024-04-25 DIAGNOSIS — N39.0 URINARY TRACT INFECTION WITHOUT HEMATURIA, SITE UNSPECIFIED: Primary | ICD-10-CM

## 2024-04-25 LAB — BACTERIA UR CULT: ABNORMAL

## 2024-04-25 RX ORDER — NITROFURANTOIN 25; 75 MG/1; MG/1
100 CAPSULE ORAL 2 TIMES DAILY
Qty: 14 CAPSULE | Refills: 0 | Status: SHIPPED | OUTPATIENT
Start: 2024-04-25 | End: 2024-05-02

## 2024-05-09 ENCOUNTER — APPOINTMENT (OUTPATIENT)
Dept: GASTROENTEROLOGY | Facility: CLINIC | Age: 49
End: 2024-05-09
Payer: MEDICAID

## 2024-05-31 ENCOUNTER — NURSING HOME VISIT (OUTPATIENT)
Dept: POST ACUTE CARE | Facility: EXTERNAL LOCATION | Age: 49
End: 2024-05-31
Payer: MEDICAID

## 2024-05-31 DIAGNOSIS — K21.9 GASTROESOPHAGEAL REFLUX DISEASE, UNSPECIFIED WHETHER ESOPHAGITIS PRESENT: ICD-10-CM

## 2024-05-31 DIAGNOSIS — I50.9 CHRONIC CONGESTIVE HEART FAILURE, UNSPECIFIED HEART FAILURE TYPE (MULTI): ICD-10-CM

## 2024-05-31 DIAGNOSIS — G82.20 PARAPLEGIA (MULTI): Primary | ICD-10-CM

## 2024-05-31 DIAGNOSIS — I10 BENIGN ESSENTIAL HYPERTENSION: ICD-10-CM

## 2024-05-31 PROCEDURE — 99309 SBSQ NF CARE MODERATE MDM 30: CPT | Performed by: REGISTERED NURSE

## 2024-05-31 NOTE — LETTER
Patient: Kathryn Mojica  : 1975    Encounter Date: 2024    PROGRESS NOTE    Subjective  Chief complaint: Kathryn Mojica is a 48 y.o. female patient being seen and evaluated for monthly general medical care and follow-up    HPI:  24 Patient presents for general medical care and f/u.  Patient seen and examined at bedside.  No issues per nursing.  Patient has no acute complaints.        Objective  Vital signs: 143/93, 97.6, 90, 16, 81.0, 97%    Physical Exam  Constitutional:       General: She is not in acute distress.  Eyes:      Extraocular Movements: Extraocular movements intact.   Pulmonary:      Effort: Pulmonary effort is normal.   Musculoskeletal:      Cervical back: Neck supple.   Neurological:      Mental Status: She is alert.   Psychiatric:         Mood and Affect: Mood normal.         Behavior: Behavior is cooperative.         Assessment/Plan  Problem List Items Addressed This Visit       Benign essential hypertension     Continue lisinopril         CHF (congestive heart failure) (Multi)     Continue current medications          Gastroesophageal reflux disease     Refer to food choices per facility          Paraplegia (Multi) - Primary     Supportive care           Medications, treatments, and labs reviewed  Continue medications and treatments as listed in EMR    Scribe Attestation  I, Tristan Yoo   attest that this documentation has been prepared under the direction and in the presence of LARISA Robison.    Provider Attestation - Scribe documentation  All medical record entries made by the Scribe were at my direction and personally dictated by me. I have reviewed the chart and agree that the record accurately reflects my personal performance of the history, physical exam, discussion and plan.    LARISA Robison      Electronically Signed By: LARISA Robison   24 10:34 AM

## 2024-06-05 ENCOUNTER — APPOINTMENT (OUTPATIENT)
Dept: GASTROENTEROLOGY | Facility: CLINIC | Age: 49
End: 2024-06-05
Payer: MEDICAID

## 2024-06-07 NOTE — PROGRESS NOTES
PROGRESS NOTE    Subjective   Chief complaint: Kathryn Mojica is a 48 y.o. female patient being seen and evaluated for monthly general medical care and follow-up    HPI:  5/31/24 Patient presents for general medical care and f/u.  Patient seen and examined at bedside.  No issues per nursing.  Patient has no acute complaints.        Objective   Vital signs: 143/93, 97.6, 90, 16, 81.0, 97%    Physical Exam  Constitutional:       General: She is not in acute distress.  Eyes:      Extraocular Movements: Extraocular movements intact.   Pulmonary:      Effort: Pulmonary effort is normal.   Musculoskeletal:      Cervical back: Neck supple.   Neurological:      Mental Status: She is alert.   Psychiatric:         Mood and Affect: Mood normal.         Behavior: Behavior is cooperative.         Assessment/Plan   Problem List Items Addressed This Visit       Benign essential hypertension     Continue lisinopril         CHF (congestive heart failure) (Multi)     Continue current medications          Gastroesophageal reflux disease     Refer to food choices per facility          Paraplegia (Multi) - Primary     Supportive care           Medications, treatments, and labs reviewed  Continue medications and treatments as listed in EMR    Scribe Attestation  I, Tristan Yoo   attest that this documentation has been prepared under the direction and in the presence of LARISA Robison.    Provider Attestation - Scribe documentation  All medical record entries made by the Scribe were at my direction and personally dictated by me. I have reviewed the chart and agree that the record accurately reflects my personal performance of the history, physical exam, discussion and plan.    LARISA Robison

## 2024-06-21 ENCOUNTER — NURSING HOME VISIT (OUTPATIENT)
Dept: POST ACUTE CARE | Facility: EXTERNAL LOCATION | Age: 49
End: 2024-06-21
Payer: MEDICAID

## 2024-06-21 DIAGNOSIS — J01.90 ACUTE SINUSITIS, RECURRENCE NOT SPECIFIED, UNSPECIFIED LOCATION: Primary | ICD-10-CM

## 2024-06-21 PROCEDURE — 99308 SBSQ NF CARE LOW MDM 20: CPT | Performed by: REGISTERED NURSE

## 2024-06-21 NOTE — LETTER
Patient: Kathryn Mojica  : 1975    Encounter Date: 2024    PROGRESS NOTE    Subjective  Chief complaint: Kathryn Mojica is a 48 y.o. female who is a long term care patient being seen and evaluated for follow up.     HPI:  HPI pt c/o sinus pain/pressure with yellow nasal drainage lasting greater than 3 days   Objective  Vital signs: 151/93, 95, 18, 81.0, 97%    Physical Exam  Constitutional:       General: She is not in acute distress.  Eyes:      Extraocular Movements: Extraocular movements intact.   Pulmonary:      Effort: Pulmonary effort is normal.   Musculoskeletal:      Cervical back: Neck supple.   Neurological:      Mental Status: She is alert.   Psychiatric:         Mood and Affect: Mood normal.         Behavior: Behavior is cooperative.         Assessment/Plan  Problem List Items Addressed This Visit       Sinusitis - Primary     Amoxicillin  Claritin           Medications, treatments, and labs reviewed  Continue medications and treatments as listed in [facility]    Scribe Attestation  Aundrea WOO Scribe   attest that this documentation has been prepared under the direction and in the presence of LARISA Robison.    Provider Attestation - Scribe documentation  All medical record entries made by the Scribe were at my direction and personally dictated by me. I have reviewed the chart and agree that the record accurately reflects my personal performance of the history, physical exam, discussion and plan.    LARISA Robison          Electronically Signed By: LARISA Robison   24 11:21 AM

## 2024-06-24 NOTE — PROGRESS NOTES
PROGRESS NOTE    Subjective   Chief complaint: Kathryn Mojica is a 48 y.o. female who is a long term care patient being seen and evaluated for follow up.     HPI:  HPI pt c/o sinus pain/pressure with yellow nasal drainage lasting greater than 3 days   Objective   Vital signs: 151/93, 95, 18, 81.0, 97%    Physical Exam  Constitutional:       General: She is not in acute distress.  Eyes:      Extraocular Movements: Extraocular movements intact.   Pulmonary:      Effort: Pulmonary effort is normal.   Musculoskeletal:      Cervical back: Neck supple.   Neurological:      Mental Status: She is alert.   Psychiatric:         Mood and Affect: Mood normal.         Behavior: Behavior is cooperative.         Assessment/Plan   Problem List Items Addressed This Visit       Sinusitis - Primary     Amoxicillin  Claritin           Medications, treatments, and labs reviewed  Continue medications and treatments as listed in [facility]    Scribe Attestation  IAundrea Scribe   attest that this documentation has been prepared under the direction and in the presence of LARISA Robison.    Provider Attestation - Scribe documentation  All medical record entries made by the Scribe were at my direction and personally dictated by me. I have reviewed the chart and agree that the record accurately reflects my personal performance of the history, physical exam, discussion and plan.    LARISA Robison

## 2024-06-28 ENCOUNTER — NURSING HOME VISIT (OUTPATIENT)
Dept: POST ACUTE CARE | Facility: EXTERNAL LOCATION | Age: 49
End: 2024-06-28
Payer: MEDICAID

## 2024-06-28 DIAGNOSIS — I10 BENIGN ESSENTIAL HYPERTENSION: ICD-10-CM

## 2024-06-28 DIAGNOSIS — K21.9 GASTROESOPHAGEAL REFLUX DISEASE, UNSPECIFIED WHETHER ESOPHAGITIS PRESENT: ICD-10-CM

## 2024-06-28 DIAGNOSIS — I50.9 CHRONIC CONGESTIVE HEART FAILURE, UNSPECIFIED HEART FAILURE TYPE (MULTI): Primary | ICD-10-CM

## 2024-06-28 DIAGNOSIS — G82.20 PARAPLEGIA (MULTI): ICD-10-CM

## 2024-06-28 PROCEDURE — 99309 SBSQ NF CARE MODERATE MDM 30: CPT | Performed by: REGISTERED NURSE

## 2024-06-28 NOTE — LETTER
Patient: Kathryn Mojica  : 1975    Encounter Date: 2024    PROGRESS NOTE    Subjective  Chief complaint: Kathryn Mojica is a 48 y.o. female patient being seen and evaluated for monthly general medical care and follow-up    HPI:  Patient presents for general medical care and f/u.  Patient seen and examined at bedside.  No issues per nursing.  Patient has no acute complaints.        Objective  Vital signs: 151/93, 97.6, 95,18, 81.0, 97%    Physical Exam  Constitutional:       General: She is not in acute distress.  Eyes:      Extraocular Movements: Extraocular movements intact.   Pulmonary:      Effort: Pulmonary effort is normal.   Musculoskeletal:      Cervical back: Neck supple.   Neurological:      Mental Status: She is alert.   Psychiatric:         Mood and Affect: Mood normal.         Behavior: Behavior is cooperative.         Assessment/Plan  Problem List Items Addressed This Visit       Benign essential hypertension     Continue lisinopril         CHF (congestive heart failure) (Multi) - Primary     Continue current medications          Gastroesophageal reflux disease     Food choices   Remain upright 30 mins after eating          Paraplegia (Multi)     Supportive care           Medications, treatments, and labs reviewed  Continue medications and treatments as listed in EMR    Scribe Attestation  I, Tristan Yoo   attest that this documentation has been prepared under the direction and in the presence of LARISA Robison.    Provider Attestation - Scribe documentation  All medical record entries made by the Scribe were at my direction and personally dictated by me. I have reviewed the chart and agree that the record accurately reflects my personal performance of the history, physical exam, discussion and plan.    LARISA Robison      Electronically Signed By: LARISA Robison   24  9:22 AM

## 2024-07-01 NOTE — PROGRESS NOTES
PROGRESS NOTE    Subjective   Chief complaint: Kathryn Mojica is a 48 y.o. female patient being seen and evaluated for monthly general medical care and follow-up    HPI:  Patient presents for general medical care and f/u.  Patient seen and examined at bedside.  No issues per nursing.  Patient has no acute complaints.        Objective   Vital signs: 151/93, 97.6, 95,18, 81.0, 97%    Physical Exam  Constitutional:       General: She is not in acute distress.  Eyes:      Extraocular Movements: Extraocular movements intact.   Pulmonary:      Effort: Pulmonary effort is normal.   Musculoskeletal:      Cervical back: Neck supple.   Neurological:      Mental Status: She is alert.   Psychiatric:         Mood and Affect: Mood normal.         Behavior: Behavior is cooperative.         Assessment/Plan   Problem List Items Addressed This Visit       Benign essential hypertension     Continue lisinopril         CHF (congestive heart failure) (Multi) - Primary     Continue current medications          Gastroesophageal reflux disease     Food choices   Remain upright 30 mins after eating          Paraplegia (Multi)     Supportive care           Medications, treatments, and labs reviewed  Continue medications and treatments as listed in EMR    Scribe Attestation  IAundrea Scribe   attest that this documentation has been prepared under the direction and in the presence of LARISA Robison.    Provider Attestation - Scribe documentation  All medical record entries made by the Scribe were at my direction and personally dictated by me. I have reviewed the chart and agree that the record accurately reflects my personal performance of the history, physical exam, discussion and plan.    ALRISA Robison

## 2024-07-09 ENCOUNTER — HOSPITAL ENCOUNTER (OUTPATIENT)
Dept: RADIOLOGY | Facility: HOSPITAL | Age: 49
Discharge: HOME | End: 2024-07-09
Payer: MEDICAID

## 2024-07-09 DIAGNOSIS — N31.9 NEUROGENIC BLADDER: ICD-10-CM

## 2024-07-09 DIAGNOSIS — N20.0 KIDNEY STONE: ICD-10-CM

## 2024-07-09 DIAGNOSIS — N39.0 URINARY TRACT INFECTION WITHOUT HEMATURIA, SITE UNSPECIFIED: ICD-10-CM

## 2024-07-09 DIAGNOSIS — Z78.9 SELF-CATHETERIZES URINARY BLADDER: ICD-10-CM

## 2024-07-09 PROCEDURE — 76770 US EXAM ABDO BACK WALL COMP: CPT | Performed by: RADIOLOGY

## 2024-07-09 PROCEDURE — 76770 US EXAM ABDO BACK WALL COMP: CPT

## 2024-07-11 PROBLEM — J32.9 SINUSITIS: Status: ACTIVE | Noted: 2024-07-11

## 2024-07-16 ENCOUNTER — APPOINTMENT (OUTPATIENT)
Dept: UROLOGY | Facility: CLINIC | Age: 49
End: 2024-07-16
Payer: MEDICAID

## 2024-07-16 VITALS — DIASTOLIC BLOOD PRESSURE: 72 MMHG | SYSTOLIC BLOOD PRESSURE: 123 MMHG | TEMPERATURE: 97.7 F | HEART RATE: 108 BPM

## 2024-07-16 DIAGNOSIS — Z78.9 SELF-CATHETERIZES URINARY BLADDER: Primary | ICD-10-CM

## 2024-07-16 DIAGNOSIS — N31.9 NEUROGENIC BLADDER: ICD-10-CM

## 2024-07-16 PROCEDURE — 99214 OFFICE O/P EST MOD 30 MIN: CPT | Performed by: NURSE PRACTITIONER

## 2024-07-16 PROCEDURE — 3078F DIAST BP <80 MM HG: CPT | Performed by: NURSE PRACTITIONER

## 2024-07-16 PROCEDURE — 3074F SYST BP LT 130 MM HG: CPT | Performed by: NURSE PRACTITIONER

## 2024-07-16 PROCEDURE — 1036F TOBACCO NON-USER: CPT | Performed by: NURSE PRACTITIONER

## 2024-07-16 NOTE — PROGRESS NOTES
Subjective   Patient ID: Kathryn Mojica is a 48 y.o. female who presents for Urinary Retention.  Patient has a complicated urologic history. History of neurogenic bladder secondary to cerebral palsy. She underwent a bladder neck closure in region of catheterizable stoma with subsequent unspecified bladder augmentation several years ago. Since that time she has completed straight cathing every 2-3 hours during the day with volumes approximately 250 to 300 mL and utilizes an indwelling 20 Kiswahili catheter overnight. She is able to manage this independently currently at an assisted living, hoping to move to an appt.      Negative cysto in April 2022 with Dr. Ahumada for eval of gross hematuria. Last UTI in April 2024, feeling well overall. No infections since then.           Review of Systems   All other systems reviewed and are negative.      Objective   Physical Exam  Vitals reviewed.     Alert and oriented x3  Moist mucous membranes  Breathes easily on room air  Abdomen soft, nondistended. Obese  No scleral icterus  Appears stated age, no acute distress    === 07/09/24 ===    US RENAL COMPLETE    - Impression -  Poorly visualized asymmetrically atrophic left kidney.    No hydronephrosis.    Irregular bladder. Correlate with prior surgical history..    MACRO:  None    Signed by: Madie Wynn 7/12/2024 6:13 PM  Dictation workstation:   [id number]    Assessment/Plan   Diagnoses and all orders for this visit:  Self-catheterizes urinary bladder  -     US renal complete; Future  Neurogenic bladder  -     US renal complete; Future    Continue with current plan of care. Given reassurance regarding kidney ultrasound.        Patience Joyce, [facility] 07/16/24 2:02 PM

## 2024-07-30 ENCOUNTER — NURSING HOME VISIT (OUTPATIENT)
Dept: POST ACUTE CARE | Facility: EXTERNAL LOCATION | Age: 49
End: 2024-07-30
Payer: MEDICAID

## 2024-07-30 DIAGNOSIS — I10 BENIGN ESSENTIAL HYPERTENSION: ICD-10-CM

## 2024-07-30 DIAGNOSIS — I50.9 CHRONIC CONGESTIVE HEART FAILURE, UNSPECIFIED HEART FAILURE TYPE (MULTI): ICD-10-CM

## 2024-07-30 DIAGNOSIS — K21.9 GASTROESOPHAGEAL REFLUX DISEASE, UNSPECIFIED WHETHER ESOPHAGITIS PRESENT: ICD-10-CM

## 2024-07-30 DIAGNOSIS — G82.20 PARAPLEGIA (MULTI): Primary | ICD-10-CM

## 2024-07-30 PROCEDURE — 99309 SBSQ NF CARE MODERATE MDM 30: CPT | Performed by: REGISTERED NURSE

## 2024-07-30 NOTE — LETTER
Patient: Kathryn Mojica  : 1975    Encounter Date: 2024    PROGRESS NOTE    Subjective  Chief complaint: Kathryn Mojica is a 48 y.o. female patient being seen and evaluated for monthly general medical care and follow-up    HPI:  Patient presents for general medical care and f/u.  Patient seen and examined at bedside.  No issues per nursing.  Patient has no acute complaints.        Objective  Vital signs: 104/67, 98.3, 83, 16, 92.0, 96%    Physical Exam  Constitutional:       General: She is not in acute distress.  Eyes:      Extraocular Movements: Extraocular movements intact.   Pulmonary:      Effort: Pulmonary effort is normal.   Musculoskeletal:      Cervical back: Neck supple.   Neurological:      Mental Status: She is alert.   Psychiatric:         Mood and Affect: Mood normal.         Behavior: Behavior is cooperative.         Assessment/Plan  Problem List Items Addressed This Visit       Benign essential hypertension     Continue lisinopril         CHF (congestive heart failure) (Multi)     Continue current medications          Gastroesophageal reflux disease     Food choices   Remain upright 30 mins after eating          Paraplegia (Multi) - Primary     Supportive care           Medications, treatments, and labs reviewed  Continue medications and treatments as listed in EMR    Scribe Attestation  I, Tristan Yoo   attest that this documentation has been prepared under the direction and in the presence of LARISA Robison.    Provider Attestation - Scribe documentation  All medical record entries made by the Scribe were at my direction and personally dictated by me. I have reviewed the chart and agree that the record accurately reflects my personal performance of the history, physical exam, discussion and plan.    LARISA Robison      Electronically Signed By: LARISA Robison   24  9:38 PM

## 2024-07-31 ENCOUNTER — TELEPHONE (OUTPATIENT)
Dept: UROLOGY | Facility: CLINIC | Age: 49
End: 2024-07-31
Payer: MEDICAID

## 2024-07-31 NOTE — TELEPHONE ENCOUNTER
Pt called stating she has the wrong bag and would like to speak with someone from the clinical team to discuss. She wants to ensure that she gives the right information for the correct bags. Please return her call

## 2024-08-01 NOTE — PROGRESS NOTES
PROGRESS NOTE    Subjective   Chief complaint: Kathryn Mojica is a 48 y.o. female patient being seen and evaluated for monthly general medical care and follow-up    HPI:  Patient presents for general medical care and f/u.  Patient seen and examined at bedside.  No issues per nursing.  Patient has no acute complaints.        Objective   Vital signs: 104/67, 98.3, 83, 16, 92.0, 96%    Physical Exam  Constitutional:       General: She is not in acute distress.  Eyes:      Extraocular Movements: Extraocular movements intact.   Pulmonary:      Effort: Pulmonary effort is normal.   Musculoskeletal:      Cervical back: Neck supple.   Neurological:      Mental Status: She is alert.   Psychiatric:         Mood and Affect: Mood normal.         Behavior: Behavior is cooperative.         Assessment/Plan   Problem List Items Addressed This Visit       Benign essential hypertension     Continue lisinopril         CHF (congestive heart failure) (Multi)     Continue current medications          Gastroesophageal reflux disease     Food choices   Remain upright 30 mins after eating          Paraplegia (Multi) - Primary     Supportive care           Medications, treatments, and labs reviewed  Continue medications and treatments as listed in EMR    Scribe Attestation  IAundrea Scribe   attest that this documentation has been prepared under the direction and in the presence of LARISA Robison.    Provider Attestation - Scribe documentation  All medical record entries made by the Scribe were at my direction and personally dictated by me. I have reviewed the chart and agree that the record accurately reflects my personal performance of the history, physical exam, discussion and plan.    LARISA Robison

## 2024-08-21 ENCOUNTER — NURSING HOME VISIT (OUTPATIENT)
Dept: POST ACUTE CARE | Facility: EXTERNAL LOCATION | Age: 49
End: 2024-08-21
Payer: MEDICAID

## 2024-08-21 DIAGNOSIS — G80.9 CEREBRAL PALSY, UNSPECIFIED TYPE (MULTI): ICD-10-CM

## 2024-08-21 DIAGNOSIS — R10.2 SUPRAPUBIC DISCOMFORT: Primary | ICD-10-CM

## 2024-08-21 DIAGNOSIS — G82.20 PARAPLEGIA (MULTI): ICD-10-CM

## 2024-08-21 PROCEDURE — 99308 SBSQ NF CARE LOW MDM 20: CPT | Performed by: REGISTERED NURSE

## 2024-08-21 NOTE — LETTER
Patient: Kathryn Mojica  : 1975    Encounter Date: 2024    PROGRESS NOTE    Subjective  Chief complaint: Kathryn Mojica is a 48 y.o. female who is a long term care patient being seen and evaluated for UTI.    HPI:  HPI pt believes she has uti. Afebrile complaints of suprapubic pain   Objective  Vital signs: 118/84, 98.3, 96, 16, 92.0, 96%    Physical Exam  Constitutional:       General: She is not in acute distress.  Eyes:      Extraocular Movements: Extraocular movements intact.   Pulmonary:      Effort: Pulmonary effort is normal.   Musculoskeletal:      Cervical back: Neck supple.   Neurological:      Mental Status: She is alert.   Psychiatric:         Mood and Affect: Mood normal.         Behavior: Behavior is cooperative.       Assessment/Plan  Problem List Items Addressed This Visit       Cerebral palsy (Multi)     Supportive care          Paraplegia (Multi)     Supportive care          Suprapubic discomfort - Primary     uacns          Medications, treatments, and labs reviewed  Continue medications and treatments as listed in [facility]    Scribe Attestation  IAundrea Scribe   attest that this documentation has been prepared under the direction and in the presence of LARISA Robison.    Provider Attestation - Scribe documentation  All medical record entries made by the Scribe were at my direction and personally dictated by me. I have reviewed the chart and agree that the record accurately reflects my personal performance of the history, physical exam, discussion and plan.    LARISA Robison          Electronically Signed By: LARISA Robison   24  2:37 PM

## 2024-08-29 PROBLEM — R10.2 SUPRAPUBIC DISCOMFORT: Status: ACTIVE | Noted: 2024-03-28

## 2024-08-29 NOTE — PROGRESS NOTES
PROGRESS NOTE    Subjective   Chief complaint: Kathryn Mojica is a 48 y.o. female who is a long term care patient being seen and evaluated for UTI.    HPI:  HPI pt believes she has uti. Afebrile complaints of suprapubic pain   Objective   Vital signs: 118/84, 98.3, 96, 16, 92.0, 96%    Physical Exam  Constitutional:       General: She is not in acute distress.  Eyes:      Extraocular Movements: Extraocular movements intact.   Pulmonary:      Effort: Pulmonary effort is normal.   Musculoskeletal:      Cervical back: Neck supple.   Neurological:      Mental Status: She is alert.   Psychiatric:         Mood and Affect: Mood normal.         Behavior: Behavior is cooperative.       Assessment/Plan   Problem List Items Addressed This Visit       Cerebral palsy (Multi)     Supportive care          Paraplegia (Multi)     Supportive care          Suprapubic discomfort - Primary     uacns          Medications, treatments, and labs reviewed  Continue medications and treatments as listed in [facility]    Scribe Attestation  IAundrea Scribe   attest that this documentation has been prepared under the direction and in the presence of LARISA Robison.    Provider Attestation - Scribe documentation  All medical record entries made by the Scribe were at my direction and personally dictated by me. I have reviewed the chart and agree that the record accurately reflects my personal performance of the history, physical exam, discussion and plan.    LARISA Robison

## 2024-08-30 ENCOUNTER — NURSING HOME VISIT (OUTPATIENT)
Dept: POST ACUTE CARE | Facility: EXTERNAL LOCATION | Age: 49
End: 2024-08-30
Payer: MEDICAID

## 2024-08-30 DIAGNOSIS — K21.9 GASTROESOPHAGEAL REFLUX DISEASE, UNSPECIFIED WHETHER ESOPHAGITIS PRESENT: ICD-10-CM

## 2024-08-30 DIAGNOSIS — N30.00 ACUTE CYSTITIS WITHOUT HEMATURIA: Primary | ICD-10-CM

## 2024-08-30 DIAGNOSIS — I10 BENIGN ESSENTIAL HYPERTENSION: ICD-10-CM

## 2024-08-30 DIAGNOSIS — G80.9 CEREBRAL PALSY, UNSPECIFIED TYPE (MULTI): ICD-10-CM

## 2024-08-30 PROCEDURE — 99309 SBSQ NF CARE MODERATE MDM 30: CPT | Performed by: REGISTERED NURSE

## 2024-08-30 NOTE — LETTER
Patient: Kathryn Mojica  : 1975    Encounter Date: 2024    PROGRESS NOTE    Subjective  Chief complaint: Kathryn Mojica is a 49 y.o. female patient being seen and evaluated for monthly general medical care and follow-up    HPI:  Patient presents for general medical care and f/u.  Patient seen and examined at bedside.  No issues per nursing.  Patient has no acute complaints.        Objective  Vital signs: 112/62, 98.3, 96, 16, 92.0, 96%    Physical Exam  Constitutional:       General: She is not in acute distress.  Eyes:      Extraocular Movements: Extraocular movements intact.   Pulmonary:      Effort: Pulmonary effort is normal.   Musculoskeletal:      Cervical back: Neck supple.   Neurological:      Mental Status: She is alert.   Psychiatric:         Mood and Affect: Mood normal.         Behavior: Behavior is cooperative.         Assessment/Plan  Problem List Items Addressed This Visit       Benign essential hypertension     Continue lisinopril         Cerebral palsy (Multi)     Supportive care          Gastroesophageal reflux disease     Food choices   Remain upright 30 mins after eating          UTI (urinary tract infection) - Primary     Continue bactrim          Medications, treatments, and labs reviewed  Continue medications and treatments as listed in EMR    Scribe Attestation  I, Tristan Yoo   attest that this documentation has been prepared under the direction and in the presence of LARISA Robison.    Provider Attestation - Scribe documentation  All medical record entries made by the Scribe were at my direction and personally dictated by me. I have reviewed the chart and agree that the record accurately reflects my personal performance of the history, physical exam, discussion and plan.    LARISA Robison      Electronically Signed By: LARISA Robison   24  9:53 AM

## 2024-09-05 PROBLEM — N39.0 UTI (URINARY TRACT INFECTION): Status: ACTIVE | Noted: 2024-09-05

## 2024-09-27 ENCOUNTER — NURSING HOME VISIT (OUTPATIENT)
Dept: POST ACUTE CARE | Facility: EXTERNAL LOCATION | Age: 49
End: 2024-09-27
Payer: MEDICAID

## 2024-09-27 DIAGNOSIS — I50.9 CHRONIC CONGESTIVE HEART FAILURE, UNSPECIFIED HEART FAILURE TYPE: Primary | ICD-10-CM

## 2024-09-27 DIAGNOSIS — F84.0 AUTISTIC DISORDER (HHS-HCC): ICD-10-CM

## 2024-09-27 DIAGNOSIS — I10 BENIGN ESSENTIAL HYPERTENSION: ICD-10-CM

## 2024-09-27 DIAGNOSIS — K21.9 GASTROESOPHAGEAL REFLUX DISEASE, UNSPECIFIED WHETHER ESOPHAGITIS PRESENT: ICD-10-CM

## 2024-09-27 PROCEDURE — 99309 SBSQ NF CARE MODERATE MDM 30: CPT | Performed by: REGISTERED NURSE

## 2024-09-27 NOTE — LETTER
Patient: Kathryn Mojica  : 1975    Encounter Date: 2024    PROGRESS NOTE    Subjective  Chief complaint: Kathryn Mojica is a 49 y.o. female patient being seen and evaluated for monthly general medical care and follow-up    HPI:  Patient presents for general medical care and f/u.  Patient seen and examined at bedside.  No issues per nursing.  Patient has no acute complaints.        Objective  Vital signs: 114/79, 98.3, 96, 16, 92.0, 96%    Physical Exam  Constitutional:       General: She is not in acute distress.  Eyes:      Extraocular Movements: Extraocular movements intact.   Pulmonary:      Effort: Pulmonary effort is normal.   Musculoskeletal:      Cervical back: Neck supple.   Neurological:      Mental Status: She is alert.   Psychiatric:         Mood and Affect: Mood normal.         Behavior: Behavior is cooperative.         Assessment/Plan  Problem List Items Addressed This Visit       Autistic disorder ([facility])     Supportive care         Benign essential hypertension     Continue lisinopril         CHF (congestive heart failure) - Primary     Continue current medications          Gastroesophageal reflux disease     Food choices   Remain upright 30 mins after eating           Medications, treatments, and labs reviewed  Continue medications and treatments as listed in EMR    Scribe Attestation  I, Tristan Yoo   attest that this documentation has been prepared under the direction and in the presence of LARISA Robison.    Provider Attestation - Scribe documentation  All medical record entries made by the Scribe were at my direction and personally dictated by me. I have reviewed the chart and agree that the record accurately reflects my personal performance of the history, physical exam, discussion and plan.    LARISA oRbison      Electronically Signed By: LARISA Robison   10/3/24 12:29 PM

## 2024-10-01 NOTE — PROGRESS NOTES
PROGRESS NOTE    Subjective   Chief complaint: Kathryn Mojica is a 49 y.o. female patient being seen and evaluated for monthly general medical care and follow-up    HPI:  Patient presents for general medical care and f/u.  Patient seen and examined at bedside.  No issues per nursing.  Patient has no acute complaints.        Objective   Vital signs: 114/79, 98.3, 96, 16, 92.0, 96%    Physical Exam  Constitutional:       General: She is not in acute distress.  Eyes:      Extraocular Movements: Extraocular movements intact.   Pulmonary:      Effort: Pulmonary effort is normal.   Musculoskeletal:      Cervical back: Neck supple.   Neurological:      Mental Status: She is alert.   Psychiatric:         Mood and Affect: Mood normal.         Behavior: Behavior is cooperative.         Assessment/Plan   Problem List Items Addressed This Visit       Autistic disorder ([facility])     Supportive care         Benign essential hypertension     Continue lisinopril         CHF (congestive heart failure) - Primary     Continue current medications          Gastroesophageal reflux disease     Food choices   Remain upright 30 mins after eating           Medications, treatments, and labs reviewed  Continue medications and treatments as listed in EMR    Scribe Attestation  IAundrea Scribe   attest that this documentation has been prepared under the direction and in the presence of LARISA Robison.    Provider Attestation - Scribe documentation  All medical record entries made by the Scribe were at my direction and personally dictated by me. I have reviewed the chart and agree that the record accurately reflects my personal performance of the history, physical exam, discussion and plan.    LARISA Robison

## 2024-10-29 ENCOUNTER — NURSING HOME VISIT (OUTPATIENT)
Dept: POST ACUTE CARE | Facility: EXTERNAL LOCATION | Age: 49
End: 2024-10-29
Payer: MEDICAID

## 2024-10-29 DIAGNOSIS — K21.9 GASTROESOPHAGEAL REFLUX DISEASE, UNSPECIFIED WHETHER ESOPHAGITIS PRESENT: ICD-10-CM

## 2024-10-29 DIAGNOSIS — I50.9 CHRONIC CONGESTIVE HEART FAILURE, UNSPECIFIED HEART FAILURE TYPE: ICD-10-CM

## 2024-10-29 DIAGNOSIS — I10 BENIGN ESSENTIAL HYPERTENSION: ICD-10-CM

## 2024-10-29 DIAGNOSIS — G82.20 PARAPLEGIA: Primary | ICD-10-CM

## 2024-10-29 PROCEDURE — 99309 SBSQ NF CARE MODERATE MDM 30: CPT | Performed by: REGISTERED NURSE

## 2024-10-30 ENCOUNTER — NURSING HOME VISIT (OUTPATIENT)
Dept: POST ACUTE CARE | Facility: EXTERNAL LOCATION | Age: 49
End: 2024-10-30
Payer: MEDICAID

## 2024-10-30 DIAGNOSIS — G80.9 CEREBRAL PALSY, UNSPECIFIED TYPE (MULTI): ICD-10-CM

## 2024-10-30 DIAGNOSIS — R10.2 SUPRAPUBIC DISCOMFORT: Primary | ICD-10-CM

## 2024-10-30 DIAGNOSIS — I10 BENIGN ESSENTIAL HYPERTENSION: ICD-10-CM

## 2024-10-30 DIAGNOSIS — I50.9 CHRONIC CONGESTIVE HEART FAILURE, UNSPECIFIED HEART FAILURE TYPE: ICD-10-CM

## 2024-10-30 PROCEDURE — 99309 SBSQ NF CARE MODERATE MDM 30: CPT | Performed by: REGISTERED NURSE

## 2024-10-30 NOTE — PROGRESS NOTES
PROGRESS NOTE    Subjective   Chief complaint: Kathryn Mojica is a 49 y.o. female patient being seen and evaluated for monthly general medical care and follow-up    HPI:  Patient presents for general medical care and f/u.  Patient seen and examined at bedside.  No issues per nursing.  Patient has no acute complaints.        Objective   Vital signs: 128/65, 96.8, 91, 18, 92.0, 98%    Physical Exam  Constitutional:       General: She is not in acute distress.  Eyes:      Extraocular Movements: Extraocular movements intact.   Pulmonary:      Effort: Pulmonary effort is normal.   Musculoskeletal:      Cervical back: Neck supple.   Neurological:      Mental Status: She is alert.   Psychiatric:         Mood and Affect: Mood normal.         Behavior: Behavior is cooperative.         Assessment/Plan   Problem List Items Addressed This Visit       Benign essential hypertension     Continue lisinopril         CHF (congestive heart failure)     Continue current medications          Gastroesophageal reflux disease     Food choices   Remain upright 30 mins after eating          Paraplegia - Primary     Supportive care           Medications, treatments, and labs reviewed  Continue medications and treatments as listed in EMR    Scribe Attestation  IAundrea Scribe   attest that this documentation has been prepared under the direction and in the presence of LARISA Robison.    Provider Attestation - Scribe documentation  All medical record entries made by the Scribe were at my direction and personally dictated by me. I have reviewed the chart and agree that the record accurately reflects my personal performance of the history, physical exam, discussion and plan.    LARISA Robison

## 2024-10-30 NOTE — LETTER
Patient: Kathryn Mojica  : 1975    Encounter Date: 10/30/2024    PROGRESS NOTE    Subjective  Chief complaint: Kathryn Mojica is a 49 y.o. female patient being seen and evaluated for monthly general medical care and follow-up    HPI:  HPI pt has dysuria will get uacns denies fever/chills       Objective  Vital signs: 120/80, 96.7, 91, 18, 92.0, 98%    Physical Exam  Constitutional:       General: She is not in acute distress.  Eyes:      Extraocular Movements: Extraocular movements intact.   Pulmonary:      Effort: Pulmonary effort is normal.   Musculoskeletal:      Cervical back: Neck supple.   Neurological:      Mental Status: She is alert.   Psychiatric:         Mood and Affect: Mood normal.         Behavior: Behavior is cooperative.       Assessment/Plan  Problem List Items Addressed This Visit       Benign essential hypertension     Continue lisinopril         Cerebral palsy     Supportive care          CHF (congestive heart failure)     Continue current medications          Suprapubic discomfort - Primary     Uacns           Medications, treatments, and labs reviewed  Continue medications and treatments as listed in EMR    Scribe Attestation  I, Tristan Yoo   attest that this documentation has been prepared under the direction and in the presence of LARISA Robison.    Provider Attestation - Scribe documentation  All medical record entries made by the Scribe were at my direction and personally dictated by me. I have reviewed the chart and agree that the record accurately reflects my personal performance of the history, physical exam, discussion and plan.    LARISA Robison      Electronically Signed By: LARISA Robison   24  3:49 PM

## 2024-10-31 NOTE — PROGRESS NOTES
PROGRESS NOTE    Subjective   Chief complaint: Kathryn Mojica is a 49 y.o. female patient being seen and evaluated for monthly general medical care and follow-up    HPI:  HPI pt has dysuria will get uacns denies fever/chills       Objective   Vital signs: 120/80, 96.7, 91, 18, 92.0, 98%    Physical Exam  Constitutional:       General: She is not in acute distress.  Eyes:      Extraocular Movements: Extraocular movements intact.   Pulmonary:      Effort: Pulmonary effort is normal.   Musculoskeletal:      Cervical back: Neck supple.   Neurological:      Mental Status: She is alert.   Psychiatric:         Mood and Affect: Mood normal.         Behavior: Behavior is cooperative.       Assessment/Plan   Problem List Items Addressed This Visit       Benign essential hypertension     Continue lisinopril         Cerebral palsy     Supportive care          CHF (congestive heart failure)     Continue current medications          Suprapubic discomfort - Primary     Uacns           Medications, treatments, and labs reviewed  Continue medications and treatments as listed in EMR    Scribe Attestation  Aundrea WOO Scribe   attest that this documentation has been prepared under the direction and in the presence of LARISA Robison.    Provider Attestation - Scribe documentation  All medical record entries made by the Scribe were at my direction and personally dictated by me. I have reviewed the chart and agree that the record accurately reflects my personal performance of the history, physical exam, discussion and plan.    LARISA Robison

## 2024-11-08 ENCOUNTER — NURSING HOME VISIT (OUTPATIENT)
Dept: POST ACUTE CARE | Facility: EXTERNAL LOCATION | Age: 49
End: 2024-11-08
Payer: MEDICAID

## 2024-11-08 DIAGNOSIS — N30.00 ACUTE CYSTITIS WITHOUT HEMATURIA: Primary | ICD-10-CM

## 2024-11-08 DIAGNOSIS — I10 BENIGN ESSENTIAL HYPERTENSION: ICD-10-CM

## 2024-11-08 PROCEDURE — 99308 SBSQ NF CARE LOW MDM 20: CPT | Performed by: REGISTERED NURSE

## 2024-11-08 NOTE — LETTER
Patient: Kathryn Mojica  : 1975    Encounter Date: 2024    PROGRESS NOTE    Subjective  Chief complaint: Kathryn Mojica is a 49 y.o. female who is a long term care patient being seen and evaluated for follow up.     HPI: symptoms improving for uti   HPIObjective  Vital signs: 146/76, 97.7, 91, 18, 92.0, 98%    Physical Exam  Constitutional:       General: She is not in acute distress.  Eyes:      Extraocular Movements: Extraocular movements intact.   Pulmonary:      Effort: Pulmonary effort is normal.   Musculoskeletal:      Cervical back: Neck supple.   Neurological:      Mental Status: She is alert.   Psychiatric:         Mood and Affect: Mood normal.         Behavior: Behavior is cooperative.         Assessment/Plan  Problem List Items Addressed This Visit       Benign essential hypertension     Continue lisinopril         UTI (urinary tract infection) - Primary     Continue atb  Improving           Medications, treatments, and labs reviewed  Continue medications and treatments as listed in [facility]    Scribe Attestation  Aundrea WOO Scribe   attest that this documentation has been prepared under the direction and in the presence of LARISA Robison.    Provider Attestation - Scribe documentation  All medical record entries made by the Scribe were at my direction and personally dictated by me. I have reviewed the chart and agree that the record accurately reflects my personal performance of the history, physical exam, discussion and plan.    LARISA Robison          Electronically Signed By: LARISA Robison   24 11:15 AM

## 2024-11-12 NOTE — PROGRESS NOTES
PROGRESS NOTE    Subjective   Chief complaint: Kathryn Mojica is a 49 y.o. female who is a long term care patient being seen and evaluated for follow up.     HPI: symptoms improving for uti   HPIObjective   Vital signs: 146/76, 97.7, 91, 18, 92.0, 98%    Physical Exam  Constitutional:       General: She is not in acute distress.  Eyes:      Extraocular Movements: Extraocular movements intact.   Pulmonary:      Effort: Pulmonary effort is normal.   Musculoskeletal:      Cervical back: Neck supple.   Neurological:      Mental Status: She is alert.   Psychiatric:         Mood and Affect: Mood normal.         Behavior: Behavior is cooperative.         Assessment/Plan   Problem List Items Addressed This Visit       Benign essential hypertension     Continue lisinopril         UTI (urinary tract infection) - Primary     Continue atb  Improving           Medications, treatments, and labs reviewed  Continue medications and treatments as listed in [facility]    Scribe Attestation  Aundrea WOO Scribe   attest that this documentation has been prepared under the direction and in the presence of LARISA Robison.    Provider Attestation - Scribe documentation  All medical record entries made by the Scribe were at my direction and personally dictated by me. I have reviewed the chart and agree that the record accurately reflects my personal performance of the history, physical exam, discussion and plan.    LARISA Robison

## 2024-11-27 ENCOUNTER — NURSING HOME VISIT (OUTPATIENT)
Dept: POST ACUTE CARE | Facility: EXTERNAL LOCATION | Age: 49
End: 2024-11-27
Payer: MEDICAID

## 2024-11-27 DIAGNOSIS — I50.9 CHRONIC CONGESTIVE HEART FAILURE, UNSPECIFIED HEART FAILURE TYPE: ICD-10-CM

## 2024-11-27 DIAGNOSIS — K21.9 GASTROESOPHAGEAL REFLUX DISEASE, UNSPECIFIED WHETHER ESOPHAGITIS PRESENT: ICD-10-CM

## 2024-11-27 DIAGNOSIS — I10 BENIGN ESSENTIAL HYPERTENSION: ICD-10-CM

## 2024-11-27 DIAGNOSIS — G82.20 PARAPLEGIA: Primary | ICD-10-CM

## 2024-11-27 PROCEDURE — 99309 SBSQ NF CARE MODERATE MDM 30: CPT | Performed by: REGISTERED NURSE

## 2024-11-27 NOTE — LETTER
Patient: Kathryn Mojica  : 1975    Encounter Date: 2024    PROGRESS NOTE    Subjective  Chief complaint: Kathryn Mojica is a 49 y.o. female patient being seen and evaluated for monthly general medical care and follow-up    HPI:  Patient presents for general medical care and f/u. Patient seen and examined at bedside. No issues per nursing. Patient has no acute complaints.        Objective  Vital signs: 110/79, 97.8, 91, 18, 127.0, 98%    Physical Exam  Constitutional:       General: She is not in acute distress.  Eyes:      Extraocular Movements: Extraocular movements intact.   Pulmonary:      Effort: Pulmonary effort is normal.   Musculoskeletal:      Cervical back: Neck supple.   Neurological:      Mental Status: She is alert.   Psychiatric:         Mood and Affect: Mood normal.         Behavior: Behavior is cooperative.         Assessment/Plan  Problem List Items Addressed This Visit       Benign essential hypertension     Continue lisinopril         CHF (congestive heart failure)     Continue current medications          Gastroesophageal reflux disease     Food choices   Remain upright 30 mins after eating          Paraplegia - Primary     Supportive care           Medications, treatments, and labs reviewed  Continue medications and treatments as listed in EMR    Scribe Attestation  IAundrea Scribe   attest that this documentation has been prepared under the direction and in the presence of LARISA Robison.    Provider Attestation - Scribe documentation  All medical record entries made by the Scribe were at my direction and personally dictated by me. I have reviewed the chart and agree that the record accurately reflects my personal performance of the history, physical exam, discussion and plan.    LARISA Robison      Electronically Signed By: LARISA Robison   24  9:02 PM

## 2024-12-03 NOTE — PROGRESS NOTES
PROGRESS NOTE    Subjective   Chief complaint: Kathryn Mojica is a 49 y.o. female patient being seen and evaluated for monthly general medical care and follow-up    HPI:  Patient presents for general medical care and f/u. Patient seen and examined at bedside. No issues per nursing. Patient has no acute complaints.        Objective   Vital signs: 110/79, 97.8, 91, 18, 127.0, 98%    Physical Exam  Constitutional:       General: She is not in acute distress.  Eyes:      Extraocular Movements: Extraocular movements intact.   Pulmonary:      Effort: Pulmonary effort is normal.   Musculoskeletal:      Cervical back: Neck supple.   Neurological:      Mental Status: She is alert.   Psychiatric:         Mood and Affect: Mood normal.         Behavior: Behavior is cooperative.         Assessment/Plan   Problem List Items Addressed This Visit       Benign essential hypertension     Continue lisinopril         CHF (congestive heart failure)     Continue current medications          Gastroesophageal reflux disease     Food choices   Remain upright 30 mins after eating          Paraplegia - Primary     Supportive care           Medications, treatments, and labs reviewed  Continue medications and treatments as listed in EMR    Scribe Attestation  IAundrea Scribe   attest that this documentation has been prepared under the direction and in the presence of LARISA Robison.    Provider Attestation - Scribe documentation  All medical record entries made by the Scribe were at my direction and personally dictated by me. I have reviewed the chart and agree that the record accurately reflects my personal performance of the history, physical exam, discussion and plan.    LARISA Robison

## 2024-12-10 ENCOUNTER — NURSING HOME VISIT (OUTPATIENT)
Dept: POST ACUTE CARE | Facility: EXTERNAL LOCATION | Age: 49
End: 2024-12-10
Payer: MEDICAID

## 2024-12-10 DIAGNOSIS — I50.9 CHRONIC CONGESTIVE HEART FAILURE, UNSPECIFIED HEART FAILURE TYPE: Primary | ICD-10-CM

## 2024-12-10 DIAGNOSIS — R10.2 SUPRAPUBIC DISCOMFORT: ICD-10-CM

## 2024-12-10 PROCEDURE — 99308 SBSQ NF CARE LOW MDM 20: CPT | Performed by: REGISTERED NURSE

## 2024-12-10 NOTE — LETTER
Patient: Kathryn Mojica  : 1975    Encounter Date: 12/10/2024    PROGRESS NOTE    Subjective  Chief complaint: Kathryn Mojica is a 49 y.o. female who is a long term care patient being seen and evaluated for follow up.     HPI:  HPI pt feels as if she has uti with suprapubic discomfort   Objective  Vital signs: 116/60, 98.1, 91, 16, 114.0, 97%    Physical Exam  Constitutional:       General: She is not in acute distress.  Eyes:      Extraocular Movements: Extraocular movements intact.   Pulmonary:      Effort: Pulmonary effort is normal.   Musculoskeletal:      Cervical back: Neck supple.   Neurological:      Mental Status: She is alert.   Psychiatric:         Mood and Affect: Mood normal.         Behavior: Behavior is cooperative.       Assessment/Plan  Problem List Items Addressed This Visit       CHF (congestive heart failure) - Primary     Continue current medications          Suprapubic discomfort     Uacns           Medications, treatments, and labs reviewed  Continue medications and treatments as listed in [facility]    Scribe Attestation  IAundrea Scribe   attest that this documentation has been prepared under the direction and in the presence of LARISA Robison.    Provider Attestation - Scribe documentation  All medical record entries made by the Scribe were at my direction and personally dictated by me. I have reviewed the chart and agree that the record accurately reflects my personal performance of the history, physical exam, discussion and plan.    LARISA Robison          Electronically Signed By: LARISA Robison   24  8:47 PM

## 2024-12-11 NOTE — PROGRESS NOTES
PROGRESS NOTE    Subjective   Chief complaint: Kathryn Mojica is a 49 y.o. female who is a long term care patient being seen and evaluated for follow up.     HPI:  HPI pt feels as if she has uti with suprapubic discomfort   Objective   Vital signs: 116/60, 98.1, 91, 16, 114.0, 97%    Physical Exam  Constitutional:       General: She is not in acute distress.  Eyes:      Extraocular Movements: Extraocular movements intact.   Pulmonary:      Effort: Pulmonary effort is normal.   Musculoskeletal:      Cervical back: Neck supple.   Neurological:      Mental Status: She is alert.   Psychiatric:         Mood and Affect: Mood normal.         Behavior: Behavior is cooperative.       Assessment/Plan   Problem List Items Addressed This Visit       CHF (congestive heart failure) - Primary     Continue current medications          Suprapubic discomfort     Uacns           Medications, treatments, and labs reviewed  Continue medications and treatments as listed in [facility]    Scribe Attestation  Aundrea WOO Scribe   attest that this documentation has been prepared under the direction and in the presence of LARISA Robison.    Provider Attestation - Scribe documentation  All medical record entries made by the Scribe were at my direction and personally dictated by me. I have reviewed the chart and agree that the record accurately reflects my personal performance of the history, physical exam, discussion and plan.    LARISA Robison

## 2024-12-16 ENCOUNTER — NURSING HOME VISIT (OUTPATIENT)
Dept: POST ACUTE CARE | Facility: EXTERNAL LOCATION | Age: 49
End: 2024-12-16
Payer: MEDICAID

## 2024-12-16 DIAGNOSIS — I10 BENIGN ESSENTIAL HYPERTENSION: ICD-10-CM

## 2024-12-16 DIAGNOSIS — N30.00 ACUTE CYSTITIS WITHOUT HEMATURIA: Primary | ICD-10-CM

## 2024-12-16 PROCEDURE — 99308 SBSQ NF CARE LOW MDM 20: CPT | Performed by: REGISTERED NURSE

## 2024-12-16 NOTE — LETTER
Patient: Kathryn Mojica  : 1975    Encounter Date: 2024    PROGRESS NOTE    Subjective  Chief complaint: Kathryn Mojica is a 49 y.o. female who is a long term care patient being seen and evaluated for follow up.     HPI:  HPI Pt on IV ATB for UTI denies fever/chills   Objective  Vital signs: 124/85, 98.1, 91, 16, 99.0, 97%    Physical Exam  Constitutional:       General: She is not in acute distress.  Eyes:      Extraocular Movements: Extraocular movements intact.   Pulmonary:      Effort: Pulmonary effort is normal.   Musculoskeletal:      Cervical back: Neck supple.   Neurological:      Mental Status: She is alert.   Psychiatric:         Mood and Affect: Mood normal.         Behavior: Behavior is cooperative.       Assessment/Plan  Problem List Items Addressed This Visit       Benign essential hypertension     Continue lisinopril         UTI (urinary tract infection) - Primary     Continue IV ATB          Medications, treatments, and labs reviewed  Continue medications and treatments as listed in [facility]    Scribe Attestation  IAundrea Scribe   attest that this documentation has been prepared under the direction and in the presence of LARISA Robison.    Provider Attestation - Scribe documentation  All medical record entries made by the Scribe were at my direction and personally dictated by me. I have reviewed the chart and agree that the record accurately reflects my personal performance of the history, physical exam, discussion and plan.    LARISA Robison          Electronically Signed By: LARISA Robison   24 11:05 AM

## 2024-12-17 NOTE — PROGRESS NOTES
PROGRESS NOTE    Subjective   Chief complaint: Kathryn Mojica is a 49 y.o. female who is a long term care patient being seen and evaluated for follow up.     HPI:  HPI Pt on IV ATB for UTI denies fever/chills   Objective   Vital signs: 124/85, 98.1, 91, 16, 99.0, 97%    Physical Exam  Constitutional:       General: She is not in acute distress.  Eyes:      Extraocular Movements: Extraocular movements intact.   Pulmonary:      Effort: Pulmonary effort is normal.   Musculoskeletal:      Cervical back: Neck supple.   Neurological:      Mental Status: She is alert.   Psychiatric:         Mood and Affect: Mood normal.         Behavior: Behavior is cooperative.       Assessment/Plan   Problem List Items Addressed This Visit       Benign essential hypertension     Continue lisinopril         UTI (urinary tract infection) - Primary     Continue IV ATB          Medications, treatments, and labs reviewed  Continue medications and treatments as listed in [facility]    Scribe Attestation  Aundrea WOO Scribe   attest that this documentation has been prepared under the direction and in the presence of LARISA Robison.    Provider Attestation - Scribe documentation  All medical record entries made by the Scribe were at my direction and personally dictated by me. I have reviewed the chart and agree that the record accurately reflects my personal performance of the history, physical exam, discussion and plan.    LARISA Robison

## 2024-12-27 ENCOUNTER — NURSING HOME VISIT (OUTPATIENT)
Dept: POST ACUTE CARE | Facility: EXTERNAL LOCATION | Age: 49
End: 2024-12-27
Payer: MEDICAID

## 2024-12-27 DIAGNOSIS — I10 BENIGN ESSENTIAL HYPERTENSION: Primary | ICD-10-CM

## 2024-12-27 DIAGNOSIS — G82.20 PARAPLEGIA: ICD-10-CM

## 2024-12-27 DIAGNOSIS — K21.9 GASTROESOPHAGEAL REFLUX DISEASE, UNSPECIFIED WHETHER ESOPHAGITIS PRESENT: ICD-10-CM

## 2024-12-27 DIAGNOSIS — I50.9 CHRONIC CONGESTIVE HEART FAILURE, UNSPECIFIED HEART FAILURE TYPE: ICD-10-CM

## 2024-12-27 PROCEDURE — 99309 SBSQ NF CARE MODERATE MDM 30: CPT | Performed by: REGISTERED NURSE

## 2024-12-27 NOTE — LETTER
Patient: Kathryn Mojica  : 1975    Encounter Date: 2024    PROGRESS NOTE    Subjective  Chief complaint: Kathryn Mojica is a 49 y.o. female patient being seen and evaluated for monthly general medical care and follow-up    HPI:  Patient presents for general medical care and f/u.  Patient seen and examined at bedside.  No issues per nursing.  Patient has no acute complaints.        Objective  Vital signs: 118/65, 98.1, 91, 16, 135.0, 97%    Physical Exam  Constitutional:       General: She is not in acute distress.  Eyes:      Extraocular Movements: Extraocular movements intact.   Pulmonary:      Effort: Pulmonary effort is normal.   Musculoskeletal:      Cervical back: Neck supple.   Neurological:      Mental Status: She is alert.   Psychiatric:         Mood and Affect: Mood normal.         Behavior: Behavior is cooperative.         Assessment/Plan  Problem List Items Addressed This Visit       Benign essential hypertension - Primary     Continue lisinopril         CHF (congestive heart failure)     Continue current medications          Gastroesophageal reflux disease     Food choices   Remain upright 30 mins after eating          Paraplegia     Supportive care           Medications, treatments, and labs reviewed  Continue medications and treatments as listed in EMR    Scribe Attestation  IAundrea Scribe   attest that this documentation has been prepared under the direction and in the presence of LARISA Robison.    Provider Attestation - Scribe documentation  All medical record entries made by the Scribe were at my direction and personally dictated by me. I have reviewed the chart and agree that the record accurately reflects my personal performance of the history, physical exam, discussion and plan.    LARISA Robison      Electronically Signed By: LARISA Robison   24  9:46 AM

## 2024-12-30 NOTE — PROGRESS NOTES
PROGRESS NOTE    Subjective   Chief complaint: Kathryn Mojica is a 49 y.o. female patient being seen and evaluated for monthly general medical care and follow-up    HPI:  Patient presents for general medical care and f/u.  Patient seen and examined at bedside.  No issues per nursing.  Patient has no acute complaints.        Objective   Vital signs: 118/65, 98.1, 91, 16, 135.0, 97%    Physical Exam  Constitutional:       General: She is not in acute distress.  Eyes:      Extraocular Movements: Extraocular movements intact.   Pulmonary:      Effort: Pulmonary effort is normal.   Musculoskeletal:      Cervical back: Neck supple.   Neurological:      Mental Status: She is alert.   Psychiatric:         Mood and Affect: Mood normal.         Behavior: Behavior is cooperative.         Assessment/Plan   Problem List Items Addressed This Visit       Benign essential hypertension - Primary     Continue lisinopril         CHF (congestive heart failure)     Continue current medications          Gastroesophageal reflux disease     Food choices   Remain upright 30 mins after eating          Paraplegia     Supportive care           Medications, treatments, and labs reviewed  Continue medications and treatments as listed in EMR    Scribe Attestation  IAundrea Scribe   attest that this documentation has been prepared under the direction and in the presence of LARISA Robison.    Provider Attestation - Scribe documentation  All medical record entries made by the Scribe were at my direction and personally dictated by me. I have reviewed the chart and agree that the record accurately reflects my personal performance of the history, physical exam, discussion and plan.    LARISA Robison

## 2025-03-10 ENCOUNTER — TELEPHONE (OUTPATIENT)
Dept: UROLOGY | Facility: CLINIC | Age: 50
End: 2025-03-10

## 2025-04-17 ENCOUNTER — APPOINTMENT (OUTPATIENT)
Dept: UROLOGY | Facility: CLINIC | Age: 50
End: 2025-04-17
Payer: MEDICAID

## 2025-04-17 VITALS — TEMPERATURE: 97.1 F | HEART RATE: 77 BPM | SYSTOLIC BLOOD PRESSURE: 120 MMHG | DIASTOLIC BLOOD PRESSURE: 77 MMHG

## 2025-04-17 DIAGNOSIS — Z78.9 SELF-CATHETERIZES URINARY BLADDER: Primary | ICD-10-CM

## 2025-04-17 DIAGNOSIS — N31.9 NEUROGENIC BLADDER: ICD-10-CM

## 2025-04-17 DIAGNOSIS — F84.0 AUTISTIC DISORDER (HHS-HCC): ICD-10-CM

## 2025-04-17 DIAGNOSIS — N39.0 URINARY TRACT INFECTION WITHOUT HEMATURIA, SITE UNSPECIFIED: ICD-10-CM

## 2025-04-17 DIAGNOSIS — N39.0 RECURRENT UTI: ICD-10-CM

## 2025-04-17 LAB
POC APPEARANCE, URINE: CLEAR
POC BILIRUBIN, URINE: NEGATIVE
POC BLOOD, URINE: ABNORMAL
POC COLOR, URINE: YELLOW
POC GLUCOSE, URINE: NEGATIVE MG/DL
POC KETONES, URINE: NEGATIVE MG/DL
POC LEUKOCYTES, URINE: ABNORMAL
POC NITRITE,URINE: POSITIVE
POC PH, URINE: 7 PH
POC PROTEIN, URINE: NEGATIVE MG/DL
POC SPECIFIC GRAVITY, URINE: 1.01
POC UROBILINOGEN, URINE: 0.2 EU/DL

## 2025-04-17 PROCEDURE — 99213 OFFICE O/P EST LOW 20 MIN: CPT | Performed by: NURSE PRACTITIONER

## 2025-04-17 PROCEDURE — 81003 URINALYSIS AUTO W/O SCOPE: CPT | Performed by: NURSE PRACTITIONER

## 2025-04-17 PROCEDURE — 1036F TOBACCO NON-USER: CPT | Performed by: NURSE PRACTITIONER

## 2025-04-17 PROCEDURE — 3074F SYST BP LT 130 MM HG: CPT | Performed by: NURSE PRACTITIONER

## 2025-04-17 PROCEDURE — 3078F DIAST BP <80 MM HG: CPT | Performed by: NURSE PRACTITIONER

## 2025-04-17 NOTE — PROGRESS NOTES
Subjective   Patient ID: Kathryn Mojica is a 49 y.o. female who presents for No chief complaint on file..    Patient has a complicated urologic history. History of neurogenic bladder secondary to cerebral palsy. She underwent a bladder neck closure in region of catheterizable stoma with subsequent unspecified bladder augmentation several years ago. Since that time she has completed straight cathing every 2-3 hours during the day with volumes approximately 250 to 300 mL and utilizes an indwelling 20 Malay catheter overnight. She is able to manage this independently currently at an assisted living.     Negative cysto in April 2022 with Dr. Ahumada for eval of gross hematuria. Multiple infections at new facility as she states she is not being cleaned appropriately. She believes she has an infection at present.              Review of Systems   All other systems reviewed and are negative.      Objective   Physical Exam  Vitals reviewed.     Alert and oriented x3  Moist mucous membranes  Breathes easily on room air  Abdomen soft, nondistended. Obese  No edema  No scleral icterus  Utilizes wheelchair  Appears stated age, no acute distress.    Office Visit on 04/17/2025   Component Date Value Ref Range Status    POC Color, Urine 04/17/2025 Yellow  Straw, Yellow, Light-Yellow Final    POC Appearance, Urine 04/17/2025 Clear  Clear Final    POC Glucose, Urine 04/17/2025 NEGATIVE  NEGATIVE mg/dl Final    POC Bilirubin, Urine 04/17/2025 NEGATIVE  NEGATIVE Final    POC Ketones, Urine 04/17/2025 NEGATIVE  NEGATIVE mg/dl Final    POC Specific Gravity, Urine 04/17/2025 1.015  1.005 - 1.035 Final    POC Blood, Urine 04/17/2025 TRACE-Intact (A)  NEGATIVE Final    POC PH, Urine 04/17/2025 7.0  No Reference Range Established PH Final    POC Protein, Urine 04/17/2025 NEGATIVE  NEGATIVE mg/dl Final    POC Urobilinogen, Urine 04/17/2025 0.2  0.2, 1.0 EU/DL Final    Poc Nitrite, Urine 04/17/2025 POSITIVE (A)  NEGATIVE Final    POC  Leukocytes, Urine 04/17/2025 TRACE (A)  NEGATIVE Final         Assessment/Plan   Diagnoses and all orders for this visit:  Self-catheterizes urinary bladder  -     Referral to Infectious Disease; Future  -     US renal complete; Future  Urinary tract infection without hematuria, site unspecified  -     POCT UA Automated manually resulted  Neurogenic bladder  -     Referral to Infectious Disease; Future  -     US renal complete; Future  Recurrent UTI  -     Referral to Infectious Disease; Future  Autistic disorder (HHS-HCC)  -     Referral to Psychiatry; Future    Plan for kidney ultrasound in summer for surveillance of neurogenic bladder. Continue on ISC. Referred to infectious disease for assistance with management. Continue with annual follow up.        INDIGO Fernandez-CNP 04/17/25 1:26 PM

## 2025-05-05 DIAGNOSIS — N39.0 RECURRENT UTI: Primary | ICD-10-CM

## 2025-05-05 RX ORDER — GRANULES FOR ORAL 3 G/1
3 POWDER ORAL
Qty: 9 G | Refills: 0 | Status: SHIPPED | OUTPATIENT
Start: 2025-05-05 | End: 2025-05-12

## 2025-07-01 ENCOUNTER — APPOINTMENT (OUTPATIENT)
Dept: RADIOLOGY | Facility: HOSPITAL | Age: 50
End: 2025-07-01
Payer: MEDICAID

## 2025-07-17 ENCOUNTER — APPOINTMENT (OUTPATIENT)
Dept: UROLOGY | Facility: CLINIC | Age: 50
End: 2025-07-17
Payer: MEDICAID

## 2026-04-17 ENCOUNTER — APPOINTMENT (OUTPATIENT)
Dept: UROLOGY | Facility: CLINIC | Age: 51
End: 2026-04-17
Payer: MEDICAID